# Patient Record
Sex: MALE | Race: BLACK OR AFRICAN AMERICAN | NOT HISPANIC OR LATINO | Employment: FULL TIME | URBAN - METROPOLITAN AREA
[De-identification: names, ages, dates, MRNs, and addresses within clinical notes are randomized per-mention and may not be internally consistent; named-entity substitution may affect disease eponyms.]

---

## 2021-09-27 ENCOUNTER — APPOINTMENT (OUTPATIENT)
Dept: GENERAL RADIOLOGY | Facility: HOSPITAL | Age: 21
End: 2021-09-27

## 2021-09-27 ENCOUNTER — HOSPITAL ENCOUNTER (EMERGENCY)
Facility: HOSPITAL | Age: 21
Discharge: HOME | End: 2021-09-28
Attending: EMERGENCY MEDICINE | Admitting: EMERGENCY MEDICINE

## 2021-09-27 DIAGNOSIS — U07.1 COVID-19: Primary | ICD-10-CM

## 2021-09-27 LAB
BASOPHILS # BLD AUTO: 0 10*3/MM3 (ref 0–0.2)
BASOPHILS NFR BLD AUTO: 0.4 % (ref 0–1.5)
DEPRECATED RDW RBC AUTO: 40.7 FL (ref 37–54)
EOSINOPHIL # BLD AUTO: 0 10*3/MM3 (ref 0–0.4)
EOSINOPHIL NFR BLD AUTO: 0.1 % (ref 0.3–6.2)
ERYTHROCYTE [DISTWIDTH] IN BLOOD BY AUTOMATED COUNT: 13.5 % (ref 12.3–15.4)
HCT VFR BLD AUTO: 41.2 % (ref 37.5–51)
HGB BLD-MCNC: 14.3 G/DL (ref 13–17.7)
LYMPHOCYTES # BLD AUTO: 0.5 10*3/MM3 (ref 0.7–3.1)
LYMPHOCYTES NFR BLD AUTO: 12 % (ref 19.6–45.3)
MCH RBC QN AUTO: 29.8 PG (ref 26.6–33)
MCHC RBC AUTO-ENTMCNC: 34.6 G/DL (ref 31.5–35.7)
MCV RBC AUTO: 86.2 FL (ref 79–97)
MONOCYTES # BLD AUTO: 0.3 10*3/MM3 (ref 0.1–0.9)
MONOCYTES NFR BLD AUTO: 7.8 % (ref 5–12)
NEUTROPHILS NFR BLD AUTO: 3.5 10*3/MM3 (ref 1.7–7)
NEUTROPHILS NFR BLD AUTO: 79.7 % (ref 42.7–76)
NRBC BLD AUTO-RTO: 0 /100 WBC (ref 0–0.2)
PLATELET # BLD AUTO: 181 10*3/MM3 (ref 140–450)
PMV BLD AUTO: 7.7 FL (ref 6–12)
RBC # BLD AUTO: 4.78 10*6/MM3 (ref 4.14–5.8)
SARS-COV-2 RNA PNL SPEC NAA+PROBE: DETECTED
WBC # BLD AUTO: 4.4 10*3/MM3 (ref 3.4–10.8)

## 2021-09-27 PROCEDURE — 71045 X-RAY EXAM CHEST 1 VIEW: CPT

## 2021-09-27 PROCEDURE — 80048 BASIC METABOLIC PNL TOTAL CA: CPT | Performed by: EMERGENCY MEDICINE

## 2021-09-27 PROCEDURE — 99283 EMERGENCY DEPT VISIT LOW MDM: CPT

## 2021-09-27 PROCEDURE — 85730 THROMBOPLASTIN TIME PARTIAL: CPT | Performed by: EMERGENCY MEDICINE

## 2021-09-27 PROCEDURE — 85379 FIBRIN DEGRADATION QUANT: CPT | Performed by: EMERGENCY MEDICINE

## 2021-09-27 PROCEDURE — 87635 SARS-COV-2 COVID-19 AMP PRB: CPT | Performed by: EMERGENCY MEDICINE

## 2021-09-27 PROCEDURE — C9803 HOPD COVID-19 SPEC COLLECT: HCPCS | Performed by: EMERGENCY MEDICINE

## 2021-09-27 PROCEDURE — 85025 COMPLETE CBC W/AUTO DIFF WBC: CPT | Performed by: EMERGENCY MEDICINE

## 2021-09-27 PROCEDURE — 93005 ELECTROCARDIOGRAM TRACING: CPT

## 2021-09-27 PROCEDURE — 84484 ASSAY OF TROPONIN QUANT: CPT | Performed by: EMERGENCY MEDICINE

## 2021-09-27 PROCEDURE — 93005 ELECTROCARDIOGRAM TRACING: CPT | Performed by: EMERGENCY MEDICINE

## 2021-09-27 PROCEDURE — 85610 PROTHROMBIN TIME: CPT | Performed by: EMERGENCY MEDICINE

## 2021-09-28 VITALS
TEMPERATURE: 99.8 F | HEART RATE: 90 BPM | DIASTOLIC BLOOD PRESSURE: 71 MMHG | SYSTOLIC BLOOD PRESSURE: 127 MMHG | OXYGEN SATURATION: 100 % | BODY MASS INDEX: 25.58 KG/M2 | WEIGHT: 210.1 LBS | HEIGHT: 76 IN | RESPIRATION RATE: 20 BRPM

## 2021-09-28 LAB
ANION GAP SERPL CALCULATED.3IONS-SCNC: 13 MMOL/L (ref 5–15)
APTT PPP: 27.7 SECONDS (ref 24–31)
BUN SERPL-MCNC: 11 MG/DL (ref 6–20)
BUN/CREAT SERPL: 10.9 (ref 7–25)
CALCIUM SPEC-SCNC: 8.8 MG/DL (ref 8.6–10.5)
CHLORIDE SERPL-SCNC: 103 MMOL/L (ref 98–107)
CO2 SERPL-SCNC: 23 MMOL/L (ref 22–29)
CREAT SERPL-MCNC: 1.01 MG/DL (ref 0.76–1.27)
D DIMER PPP FEU-MCNC: 0.23 MG/L (FEU) (ref 0–0.59)
GFR SERPL CREATININE-BSD FRML MDRD: 115 ML/MIN/1.73
GLUCOSE SERPL-MCNC: 87 MG/DL (ref 65–99)
INR PPP: 1.09 (ref 0.93–1.1)
POTASSIUM SERPL-SCNC: 3.5 MMOL/L (ref 3.5–5.2)
PROTHROMBIN TIME: 12 SECONDS (ref 9.6–11.7)
SODIUM SERPL-SCNC: 139 MMOL/L (ref 136–145)
TROPONIN T SERPL-MCNC: <0.01 NG/ML (ref 0–0.03)

## 2021-09-30 LAB — QT INTERVAL: 324 MS

## 2023-07-13 NOTE — H&P (VIEW-ONLY)
07/13/2023  Foot and Ankle Surgery - New Patient   Provider: Dr. Eliezer Coelho DPM  Location: [facility]    Subjective:  Andrew Toney is a 22 y.o. male.     Chief Complaint   Patient presents with    Right Ankle - Pain, Fracture    Establish Care     No PCP       HPI:  The patient is a 22-year-old male who presents to the office today for evaluation of right ankle injury. He is accompanied by an adult female today.     The patient sustained a right ankle injury on 07/07/2023 while spinning around on a stripper pole. The pole fell off the roof while he was on the pole, and he attempted to catch himself with his right foot. He reports constant pain in his right ankle and has significant pain when he wraps it. He notes significant bruising and swelling. The patient states that he removes the boot to go to bed occasionally.    The patient reports a history of growth plate repair.    No Known Allergies    History reviewed. No pertinent past medical history.    History reviewed. No pertinent surgical history.    Family History   Problem Relation Age of Onset    Dislocations Mother     Cancer Father        Social History     Socioeconomic History    Marital status: Single   Tobacco Use    Smoking status: Some Days     Types: Cigars   Substance and Sexual Activity    Alcohol use: Yes     Alcohol/week: 10.0 standard drinks     Types: 10 Shots of liquor per week     Comment: Drink on weekends only    Drug use: Never    Sexual activity: Yes     Partners: Male     Birth control/protection: Condom        Current Outpatient Medications on File Prior to Visit   Medication Sig Dispense Refill    traMADol (ULTRAM) 50 MG tablet Take 1 tablet by mouth Every 6 (Six) Hours As Needed for Moderate Pain. (Patient not taking: Reported on 7/13/2023) 10 tablet 0     No current facility-administered medications on file prior to visit.       Review of Systems:  General: Denies fever, chills, fatigue, and weakness.  Eyes: Denies  "vision loss, blurry vision, and excessive redness.  ENT: Denies hearing issues and difficulty swallowing.  Cardiovascular: Denies palpitations, chest pain, or syncopal episodes.  Respiratory: Denies shortness of breath, wheezing, and coughing.  GI: Denies abdominal pain, nausea, and vomiting.   : Denies frequency, hematuria, and urgency.  Musculoskeletal: Denies muscle cramps, joint pains, and stiffness.  Derm: Denies rash, open wounds, or suspicious lesions.  Neuro: Denies headaches, numbness, loss of coordination, and tremors.  Psych: Denies anxiety and depression.  Endocrine: Denies temperature intolerance and changes in appetite.  Heme: Denies bleeding disorders or abnormal bruising.     Objective   Pulse 85   Ht 193 cm (76\")   Wt 107 kg (235 lb)   SpO2 99%   BMI 28.61 kg/m²     Foot/Ankle Exam    GENERAL  Orientation:  AAOx3  Affect:  appropriate    VASCULAR     Right Foot Vascularity   Normal vascular exam    Dorsalis pedis:  2+  Posterior tibial:  2+  Skin temperature:  warm  Edema grading:  None  CFT:  < 3 seconds  Pedal hair growth:  Present  Varicosities:  none     Left Foot Vascularity   Normal vascular exam    Dorsalis pedis:  2+  Posterior tibial:  2+  Skin temperature:  warm  Edema grading:  None  CFT:  < 3 seconds  Pedal hair growth:  Present  Varicosities:  none     NEUROLOGIC     Right Foot Neurologic   Light touch sensation: normal  Hot/Cold sensation: normal  Achilles reflex:  2+     Left Foot Neurologic   Light touch sensation: normal  Hot/Cold sensation:  normal  Achilles reflex:  2+    MUSCULOSKELETAL     Right Foot Musculoskeletal   Arch:  Normal     Left Foot Musculoskeletal   Arch:  Normal    MUSCLE STRENGTH     Right Foot Muscle Strength   Normal strength    Foot dorsiflexion:  5  Foot plantar flexion:  5  Foot inversion:  5  Foot eversion:  5     Left Foot Muscle Strength   Normal strength    Foot dorsiflexion:  5  Foot plantar flexion:  5  Foot inversion:  5  Foot eversion:  " 5    DERMATOLOGIC      Right Foot Dermatologic   Skin  Right foot skin is intact.   Nails comment:  Nails 1-5     Left Foot Dermatologic   Skin  Left foot skin is intact.   Nails comment:  Nails 1-5    TESTS     Right Foot Tests   Anterior drawer: negative  Varus tilt: negative     Left Foot Tests   Anterior drawer: negative  Varus tilt: negative     Right foot additional comments: Significant swelling, ecchymosis, and pain involving the perimalleolar region. No proximal fibular pain. No resting deformity. Range of motion, muscle strength and instability testing deferred secondary to guarding. X-ray shows a displaced unstable ankle fracture.    Assessment & Plan   Diagnoses and all orders for this visit:    1. Acute right ankle pain (Primary)    2. Closed displaced trimalleolar fracture of right ankle, initial encounter  -     XR Chest 2 View; Future  -     ECG 12 Lead; Future  -     Case Request; Standing  -     CBC (No Diff); Future  -     Basic Metabolic Panel; Future  -     ceFAZolin (ANCEF) 2,000 mg in sodium chloride 0.9 % 100 mL IVPB  -     Case Request    Other orders  -     methylPREDNISolone (MEDROL) 4 MG dose pack; Take as directed on package instructions.  Dispense: 21 tablet; Refill: 0  -     Follow Anesthesia Guidelines / Protocol; Future  -     Follow Anesthesia Guidelines / Protocol; Standing  -     Verify / Perform Chlorhexidine Skin Prep; Standing  -     Verify / Perform Chlorhexidine Skin Prep if Indicated (If Not Already Completed); Standing  -     Obtain Informed Consent; Future  -     Provide NPO Instructions to Patient; Future  -     Chlorhexidine Skin Prep; Future        The patient is a 22-year-old male who presents to the office today for evaluation of right ankle injury. X-rays of the right ankle, taken today, were independently reviewed revealing a displaced, unstable ankle fracture. We discussed the etiology and biomechanics involved with his right ankle injury. I recommend surgical  intervention with plate and screw fixation. We discussed the procedure, risks, benefits, and recovery at length. I explained that he will be non-weightbearing for at least 2 weeks following surgery. After surgery, we will transition him into a boot. I explained that it will take approximately 8 weeks before he is back in a regular shoe. I will prescribe a Medrol Dosepak today. I advised the patient to elevate his right ankle above the level of his heart. He was given a Tubigrip compression to help with swelling.  Patient is to remain nonweightbearing during the interim.  I have dispensed a cam boot for added protection.  Patient should remain in the boot a majority of the day and less resting.  I will have the surgery schedulers contact him regarding scheduling.       Greater than 45 minutes was spent before, during, and after evaluation for patient care.     Orders Placed This Encounter   Procedures    XR Chest 2 View     Standing Status:   Future     Standing Expiration Date:   7/14/2024     Order Specific Question:   Reason for Exam:     Answer:   Preop    CBC (No Diff)     Standing Status:   Future     Standing Expiration Date:   7/14/2024    Basic Metabolic Panel     Standing Status:   Future     Standing Expiration Date:   7/14/2024    Obtain Informed Consent     Standing Status:   Future     Order Specific Question:   Informed Consent Given For     Answer:   Open reduction internal fixation of right trimalleolar ankle fracture    Provide NPO Instructions to Patient     Standing Status:   Future    Chlorhexidine Skin Prep     Chlorhexidine Skin Prep and Instructions For All Patients Having A Procedure Requiring an Outward Incision if Not Allergic. If Allergic, Give Antibacterial Skin Wipes and Instructions. Do Not Use For Facial Cases or on Any Mucus Membranes.     Standing Status:   Future    ECG 12 Lead     Standing Status:   Future     Standing Expiration Date:   7/14/2024     Order Specific Question:    Reason for Exam:     Answer:   Preop        Note is dictated utilizing voice recognition software. Unfortunately this leads to occasional typographical errors. I apologize in advance if the situation occurs. If questions occur please do not hesitate to call our office.    Transcribed from ambient dictation for ADONIS Coelho DPM by Sakshi Mulligan.  07/13/23   13:33 EDT    Patient or patient representative verbalized consent to the visit recording.  I have personally performed the services described in this document as transcribed by the above individual, and it is both accurate and complete.

## 2023-07-14 PROBLEM — S82.851A CLOSED DISPLACED TRIMALLEOLAR FRACTURE OF RIGHT LOWER LEG: Status: ACTIVE | Noted: 2023-07-14

## 2023-07-21 ENCOUNTER — HOSPITAL ENCOUNTER (OUTPATIENT)
Facility: HOSPITAL | Age: 23
Setting detail: HOSPITAL OUTPATIENT SURGERY
Discharge: HOME | End: 2023-07-21
Attending: PODIATRIST | Admitting: PODIATRIST
Payer: MEDICAID

## 2023-07-21 VITALS
HEIGHT: 75 IN | HEART RATE: 81 BPM | WEIGHT: 230 LBS | RESPIRATION RATE: 18 BRPM | OXYGEN SATURATION: 97 % | DIASTOLIC BLOOD PRESSURE: 66 MMHG | SYSTOLIC BLOOD PRESSURE: 122 MMHG | BODY MASS INDEX: 28.6 KG/M2 | TEMPERATURE: 97.7 F

## 2023-07-21 DIAGNOSIS — S82.851A CLOSED DISPLACED TRIMALLEOLAR FRACTURE OF RIGHT ANKLE, INITIAL ENCOUNTER: ICD-10-CM

## 2023-07-21 PROCEDURE — C1713 ANCHOR/SCREW BN/BN,TIS/BN: HCPCS | Performed by: PODIATRIST

## 2023-07-21 PROCEDURE — 27822 TREATMENT OF ANKLE FRACTURE: CPT | Performed by: PODIATRIST

## 2023-07-21 PROCEDURE — 25010000002 HYDROMORPHONE 1 MG/ML SOLUTION: Performed by: NURSE ANESTHETIST, CERTIFIED REGISTERED

## 2023-07-21 DEVICE — IMPLANTABLE DEVICE
Type: IMPLANTABLE DEVICE | Site: ANKLE | Status: FUNCTIONAL
Brand: ORTHOLOC

## 2023-07-21 DEVICE — IMPLANTABLE DEVICE
Type: IMPLANTABLE DEVICE | Site: ANKLE | Status: FUNCTIONAL
Brand: DARCO

## 2023-07-21 DEVICE — IMPLANTABLE DEVICE
Type: IMPLANTABLE DEVICE | Site: ANKLE | Status: FUNCTIONAL
Brand: ORTHOLOC 3DI

## 2023-07-21 DEVICE — IMPLANTABLE DEVICE
Type: IMPLANTABLE DEVICE | Site: ANKLE | Status: FUNCTIONAL
Brand: ORTHOLOC 3DI PLATING SYSTEM

## 2023-07-21 RX ORDER — IPRATROPIUM BROMIDE AND ALBUTEROL SULFATE 2.5; .5 MG/3ML; MG/3ML
3 SOLUTION RESPIRATORY (INHALATION) ONCE AS NEEDED
Status: DISCONTINUED | OUTPATIENT
Start: 2023-07-21 | End: 2023-07-21 | Stop reason: HOSPADM

## 2023-07-21 RX ORDER — ACETAMINOPHEN 325 MG/1
650 TABLET ORAL ONCE AS NEEDED
Status: DISCONTINUED | OUTPATIENT
Start: 2023-07-21 | End: 2023-07-21 | Stop reason: HOSPADM

## 2023-07-21 RX ORDER — PROMETHAZINE HYDROCHLORIDE 25 MG/1
25 TABLET ORAL ONCE AS NEEDED
Status: DISCONTINUED | OUTPATIENT
Start: 2023-07-21 | End: 2023-07-21 | Stop reason: HOSPADM

## 2023-07-21 RX ORDER — MEPERIDINE HYDROCHLORIDE 25 MG/ML
12.5 INJECTION INTRAMUSCULAR; INTRAVENOUS; SUBCUTANEOUS
Status: DISCONTINUED | OUTPATIENT
Start: 2023-07-21 | End: 2023-07-21 | Stop reason: HOSPADM

## 2023-07-21 RX ORDER — DIPHENHYDRAMINE HYDROCHLORIDE 50 MG/ML
12.5 INJECTION INTRAMUSCULAR; INTRAVENOUS ONCE AS NEEDED
Status: DISCONTINUED | OUTPATIENT
Start: 2023-07-21 | End: 2023-07-21 | Stop reason: HOSPADM

## 2023-07-21 RX ORDER — DIPHENHYDRAMINE HCL 25 MG
25 CAPSULE ORAL
Status: DISCONTINUED | OUTPATIENT
Start: 2023-07-21 | End: 2023-07-21 | Stop reason: HOSPADM

## 2023-07-21 RX ORDER — HYDROCODONE BITARTRATE AND ACETAMINOPHEN 10; 325 MG/1; MG/1
1 TABLET ORAL EVERY 4 HOURS PRN
Status: DISCONTINUED | OUTPATIENT
Start: 2023-07-21 | End: 2023-07-21 | Stop reason: HOSPADM

## 2023-07-21 RX ORDER — NALOXONE HCL 0.4 MG/ML
0.4 VIAL (ML) INJECTION AS NEEDED
Status: DISCONTINUED | OUTPATIENT
Start: 2023-07-21 | End: 2023-07-21 | Stop reason: HOSPADM

## 2023-07-21 RX ORDER — MIDAZOLAM HYDROCHLORIDE 1 MG/ML
2 INJECTION INTRAMUSCULAR; INTRAVENOUS
Status: DISCONTINUED | OUTPATIENT
Start: 2023-07-21 | End: 2023-07-21 | Stop reason: HOSPADM

## 2023-07-21 RX ORDER — LABETALOL HYDROCHLORIDE 5 MG/ML
5 INJECTION, SOLUTION INTRAVENOUS
Status: DISCONTINUED | OUTPATIENT
Start: 2023-07-21 | End: 2023-07-21 | Stop reason: HOSPADM

## 2023-07-21 RX ORDER — ACETAMINOPHEN 650 MG/1
325 SUPPOSITORY RECTAL EVERY 4 HOURS PRN
Status: DISCONTINUED | OUTPATIENT
Start: 2023-07-21 | End: 2023-07-21 | Stop reason: HOSPADM

## 2023-07-21 RX ORDER — EPHEDRINE SULFATE 5 MG/ML
5 INJECTION INTRAVENOUS ONCE AS NEEDED
Status: DISCONTINUED | OUTPATIENT
Start: 2023-07-21 | End: 2023-07-21 | Stop reason: HOSPADM

## 2023-07-21 RX ORDER — FLUMAZENIL 0.1 MG/ML
0.5 INJECTION INTRAVENOUS AS NEEDED
Status: DISCONTINUED | OUTPATIENT
Start: 2023-07-21 | End: 2023-07-21 | Stop reason: HOSPADM

## 2023-07-21 RX ORDER — FENTANYL CITRATE 50 UG/ML
50 INJECTION, SOLUTION INTRAMUSCULAR; INTRAVENOUS
Status: DISCONTINUED | OUTPATIENT
Start: 2023-07-21 | End: 2023-07-21 | Stop reason: HOSPADM

## 2023-07-21 RX ORDER — HYDROCODONE BITARTRATE AND ACETAMINOPHEN 7.5; 325 MG/1; MG/1
1 TABLET ORAL EVERY 6 HOURS PRN
Qty: 28 TABLET | Refills: 0 | Status: SHIPPED | OUTPATIENT
Start: 2023-07-21 | End: 2023-07-30

## 2023-07-21 RX ORDER — HYDRALAZINE HYDROCHLORIDE 20 MG/ML
5 INJECTION INTRAMUSCULAR; INTRAVENOUS
Status: DISCONTINUED | OUTPATIENT
Start: 2023-07-21 | End: 2023-07-21 | Stop reason: HOSPADM

## 2023-07-21 RX ORDER — DIPHENHYDRAMINE HYDROCHLORIDE 50 MG/ML
12.5 INJECTION INTRAMUSCULAR; INTRAVENOUS
Status: DISCONTINUED | OUTPATIENT
Start: 2023-07-21 | End: 2023-07-21 | Stop reason: HOSPADM

## 2023-07-21 RX ORDER — SODIUM CHLORIDE 0.9 % (FLUSH) 0.9 %
10 SYRINGE (ML) INJECTION AS NEEDED
Status: DISCONTINUED | OUTPATIENT
Start: 2023-07-21 | End: 2023-07-21 | Stop reason: HOSPADM

## 2023-07-21 RX ORDER — PROMETHAZINE HYDROCHLORIDE 25 MG/1
25 SUPPOSITORY RECTAL ONCE AS NEEDED
Status: DISCONTINUED | OUTPATIENT
Start: 2023-07-21 | End: 2023-07-21 | Stop reason: HOSPADM

## 2023-07-21 RX ORDER — SODIUM CHLORIDE 9 MG/ML
40 INJECTION, SOLUTION INTRAVENOUS AS NEEDED
Status: DISCONTINUED | OUTPATIENT
Start: 2023-07-21 | End: 2023-07-21 | Stop reason: HOSPADM

## 2023-07-21 RX ORDER — LORAZEPAM 2 MG/ML
1 INJECTION INTRAMUSCULAR
Status: DISCONTINUED | OUTPATIENT
Start: 2023-07-21 | End: 2023-07-21 | Stop reason: HOSPADM

## 2023-07-21 RX ORDER — SODIUM CHLORIDE 0.9 % (FLUSH) 0.9 %
10 SYRINGE (ML) INJECTION EVERY 12 HOURS SCHEDULED
Status: DISCONTINUED | OUTPATIENT
Start: 2023-07-21 | End: 2023-07-21 | Stop reason: HOSPADM

## 2023-07-21 RX ORDER — SODIUM CHLORIDE, SODIUM LACTATE, POTASSIUM CHLORIDE, AND CALCIUM CHLORIDE .6; .31; .03; .02 G/100ML; G/100ML; G/100ML; G/100ML
20 INJECTION, SOLUTION INTRAVENOUS CONTINUOUS
Status: DISCONTINUED | OUTPATIENT
Start: 2023-07-21 | End: 2023-07-21 | Stop reason: HOSPADM

## 2023-07-21 RX ORDER — ONDANSETRON 2 MG/ML
4 INJECTION INTRAMUSCULAR; INTRAVENOUS ONCE AS NEEDED
Status: DISCONTINUED | OUTPATIENT
Start: 2023-07-21 | End: 2023-07-21 | Stop reason: HOSPADM

## 2023-07-21 NOTE — OP NOTE
Operative Note   Foot and Ankle Surgery   Provider: Dr. Eliezer Coelho   Location: [facility]      Procedure:  1.  Open reduction internal fixation of trimalleolar ankle fracture without posterior lip fixation, right    Pre-operative Diagnosis:   1.  Closed, displaced trimalleolar ankle fracture, right    Post-operative Diagnosis: Same    Surgeon: Eliezer Coelho    Assistant: Antony Caceres, PGY 3    Anesthesia: General with block    Implants: Sudeep distal contoured fibular plate with locking and nonlocking 3.5 millimeter screws; cannulated 3.5 millimeter screw x2; cannulated 4.0 millimeter screw x2    Findings: No unexpected findings    Specimen: None    Blood Loss: Less than 5cc    Complications: None    Post Op Plan: Discharge home.  Strict nonweightbearing activity.  Follow-up with me in 2 weeks    Summary:    Patient is a 22-year-old male that has been seen in office after injury involving his right lower extremity.  Imaging was reviewed in office showing displaced trimalleolar ankle fracture.  I did review the imaging, diagnosis, and treatment options with him at length.  We reviewed the need for open reduction and internal fixation.  I discussed the procedure and aftercare with him at length.  He understands that he will require decreased overall activity and off weightbearing immediately after surgery.  We did discuss risks of posttraumatic arthritis and potential need for hardware removal or additional surgeries in the future.    Procedure, risks, complications, and goals were discussed with the patient at bedside.  Risks include but are not limited to infection, complications from anesthesia (including death), chronic pain or numbness, hematoma/seroma, deep vein thrombosis, wound complications, and potential for additional surgical procedures.  Patient understands and elects to proceed with surgery at this time. Informed consent was obtained before proceeding to the operating suite.  All questions  were answered to the patient's satisfaction. No guarantees or assurances were given or implied.    Procedure:    Patient was brought to the operating room and placed on the operative table in supine position.  Once adequate general anesthesia was administered, a pneumatic tourniquet was placed about the patient's right thigh.  The right lower extremity was scrubbed prepped and draped in the usual sterile fashion.  The limb was elevated and exsanguinated and the pneumatic tourniquet was inflated to 300 mmHg.  A formal timeout was conducted prior skin incision.    Attention was then directed to the lateral aspect of the ankle.  A linear longitudinal incision was performed over the midline of the distal fibula.  Incision was deepened in layers to the level of the fracture site.  Care was taken to preserve the superficial peroneal nerve.  The fracture hematoma was evacuated and irrigation was performed.  The fracture was mobilized and reduced and maintained.  Reduction was checked with fluoroscopy.  Definitive fixation was achieved utilizing two 3.5 mm cannulated screws from an anterior to posterior orientation across the fracture site.  The fixation was then neutralized with a distal contoured fibular plate secured to bone utilizing 3.5 millimeter locking and nonlocking screws of various lengths.  Fixation was placed under fluoroscopic guidance.    Attention was then directed to the medial malleolus.  A linear longitudinal incision was performed.  Again, incision was deepened to the level of the fracture site.  Disruption of the periosteum was identified and the fracture hematoma was evacuated.  All interposed periosteum was removed.  Reduction was achieved with a tenaculum and fixation was achieved utilizing two 4.0 mm cannulated screws from a distal to proximal orientation across the transverse fracture.  Final imaging was performed showing excellent reduction of the trimalleolar ankle fracture and stable internal  fixation.  The posterior malleolar fragment was identified which was well reduced and small and did not require fixation.  The syndesmosis was stressed under live fluoroscopy showing no diastases or medial gapping.  The incision sites were irrigated with normal saline.  Deep structures were closed with a 2-0 Vicryl in a simple interrupted manner.  The subcutaneous tissues were closed with 4-0 Vicryl and the skin was repaired with skin staples.  Incisions were dressed with Xeroform and sterile compressive dressings.  The tourniquet was released and a prompt hyperemic response was noted to all digits of the right lower extremity.  The limb was placed into a well-padded posterior splint.  Patient tolerated the procedure and anesthesia well.  He was transferred from the operating room to the recovery room with vital signs stable and neurovascular status unchanged to the right lower extremity.      Dr. Eliezer Coelho, ZIA  [facility]  [phone]    Note is dictated utilizing voice recognition software. Unfortunately this leads to occasional typographical errors. I apologize in advance if the situation occurs. If questions occur please do not hesitate to call our office.

## 2023-07-27 ENCOUNTER — HOSPITAL ENCOUNTER (EMERGENCY)
Facility: HOSPITAL | Age: 23
Discharge: HOME | End: 2023-07-27
Attending: EMERGENCY MEDICINE
Payer: MEDICAID

## 2023-07-27 VITALS
DIASTOLIC BLOOD PRESSURE: 65 MMHG | WEIGHT: 229.94 LBS | BODY MASS INDEX: 28.59 KG/M2 | HEART RATE: 78 BPM | SYSTOLIC BLOOD PRESSURE: 115 MMHG | TEMPERATURE: 98.3 F | OXYGEN SATURATION: 94 % | HEIGHT: 75 IN | RESPIRATION RATE: 19 BRPM

## 2023-07-27 DIAGNOSIS — G89.18 POSTOPERATIVE PAIN: Primary | ICD-10-CM

## 2023-07-27 PROCEDURE — 99283 EMERGENCY DEPT VISIT LOW MDM: CPT

## 2023-07-27 RX ORDER — HYDROCODONE BITARTRATE AND ACETAMINOPHEN 7.5; 325 MG/1; MG/1
1 TABLET ORAL ONCE
Status: COMPLETED | OUTPATIENT
Start: 2023-07-27 | End: 2023-07-27

## 2023-07-27 RX ADMIN — HYDROCODONE BITARTRATE AND ACETAMINOPHEN 1 TABLET: 7.5; 325 TABLET ORAL at 04:30

## 2023-07-27 NOTE — ED PROVIDER NOTES
Subjective   History of Present Illness  22-year-old male with postoperative right ankle pain status post trimalleolar fracture repair per Dr. Coelho on 7/21/2023.  Patient denies any calf pain or posterior knee pain or medial thigh pain.  No fever, no drainage.  Pain has been doing well until the evenings for the past several nights.  Patient has a follow-up appointment on 8/3/2023    Review of Systems   Musculoskeletal:         As per HPI     Past Medical History:   Diagnosis Date    Murmur, heart     states he has had this since he was young       No Known Allergies    Past Surgical History:   Procedure Laterality Date    ANKLE OPEN REDUCTION INTERNAL FIXATION Right 7/21/2023    Procedure: ANKLE OPEN REDUCTION INTERNAL FIXATION;  Surgeon: ADONIS Coelho DPM;  Location: [facility];  Service: Podiatry;  Laterality: Right;    ELBOW PROCEDURE      TONSILLECTOMY         Family History   Problem Relation Age of Onset    Dislocations Mother     Cancer Father        Social History     Socioeconomic History    Marital status: Single   Tobacco Use    Smoking status: Some Days     Types: Cigars   Vaping Use    Vaping Use: Never used   Substance and Sexual Activity    Alcohol use: Yes     Alcohol/week: 10.0 standard drinks     Types: 10 Shots of liquor per week     Comment: Drink on weekends only    Drug use: Never    Sexual activity: Yes     Partners: Male     Birth control/protection: Condom           Objective   Physical Exam  Constitutional:       Appearance: Normal appearance.   Musculoskeletal:      Comments: Toes move up and down, normal cap refill, toes are warm and well-perfused, no calf tenderness or pain or tenderness over deep venous system, no edema appreciated   Skin:     General: Skin is warm and dry.   Neurological:      Mental Status: He is alert.   Psychiatric:         Mood and Affect: Mood normal.         Behavior: Behavior normal.       Procedures           ED Course                                            Medical Decision Making  Well-appearing 22-year-old male with postoperative ankle pain.  Low suspicion for infection, I explained to them I prefer not to disturb the wound at this point, return precautions reviewed, signs and symptoms of DVT discussed, not present today, recommend close follow-up with his podiatrist and I recommended they call him in the morning.    Risk  Prescription drug management.        Final diagnoses:   None       ED Disposition  ED Disposition       None            No follow-up provider specified.       Medication List      No changes were made to your prescriptions during this visit.

## 2023-07-28 ENCOUNTER — TELEPHONE (OUTPATIENT)
Dept: PODIATRY | Facility: CLINIC | Age: 23
End: 2023-07-28
Payer: MEDICAID

## 2023-07-28 NOTE — TELEPHONE ENCOUNTER
Caller: Andrew Toney    Relationship: Self    Best call back number: [phone]    Requested Prescriptions: NORCO 7.5-325 MG  Requested Prescriptions      No prescriptions requested or ordered in this encounter        Pharmacy where request should be sent:  [facility] [place]     Last office visit with prescribing clinician: 7/13/2023     Next office visit with prescribing clinician: 8/3/2023     Does the patient have less than a 3 day supply:  [x] Yes  [] No    Would you like a call back once the refill request has been completed: [x] Yes [] No    If the office needs to give you a call back, can they leave a voicemail: [x] Yes [] No    Mariella Rosado Rep   07/28/23 12:20 EDT

## 2023-07-30 RX ORDER — HYDROCODONE BITARTRATE AND ACETAMINOPHEN 7.5; 325 MG/1; MG/1
1 TABLET ORAL EVERY 8 HOURS PRN
Qty: 20 TABLET | Refills: 0 | Status: SHIPPED | OUTPATIENT
Start: 2023-07-30

## 2023-08-01 ENCOUNTER — TELEPHONE (OUTPATIENT)
Dept: PODIATRY | Facility: CLINIC | Age: 23
End: 2023-08-01

## 2023-08-01 ENCOUNTER — OFFICE VISIT (OUTPATIENT)
Dept: PODIATRY | Facility: CLINIC | Age: 23
End: 2023-08-01
Payer: MEDICAID

## 2023-08-01 VITALS — HEIGHT: 75 IN | HEART RATE: 96 BPM | OXYGEN SATURATION: 98 % | WEIGHT: 229 LBS | BODY MASS INDEX: 28.47 KG/M2

## 2023-08-01 DIAGNOSIS — S82.851A CLOSED DISPLACED TRIMALLEOLAR FRACTURE OF RIGHT ANKLE, INITIAL ENCOUNTER: ICD-10-CM

## 2023-08-01 DIAGNOSIS — M25.571 ACUTE RIGHT ANKLE PAIN: Primary | ICD-10-CM

## 2023-08-15 ENCOUNTER — OFFICE VISIT (OUTPATIENT)
Dept: PODIATRY | Facility: CLINIC | Age: 23
End: 2023-08-15
Payer: MEDICAID

## 2023-08-15 VITALS — HEIGHT: 75 IN | HEART RATE: 76 BPM | BODY MASS INDEX: 28.47 KG/M2 | WEIGHT: 229 LBS | OXYGEN SATURATION: 98 %

## 2023-08-15 DIAGNOSIS — S82.851D CLOSED DISPLACED TRIMALLEOLAR FRACTURE OF RIGHT ANKLE WITH ROUTINE HEALING, SUBSEQUENT ENCOUNTER: Primary | ICD-10-CM

## 2023-08-15 PROCEDURE — 1160F RVW MEDS BY RX/DR IN RCRD: CPT | Performed by: PODIATRIST

## 2023-08-15 PROCEDURE — 1159F MED LIST DOCD IN RCRD: CPT | Performed by: PODIATRIST

## 2023-08-15 PROCEDURE — 99024 POSTOP FOLLOW-UP VISIT: CPT | Performed by: PODIATRIST

## 2023-08-15 NOTE — PROGRESS NOTES
"08/15/2023  Foot and Ankle Surgery - Established Patient/Follow-up  Provider: Dr. Eliezer Coelho DPM  Location: [facility]    Subjective:  Andrew Toney is a 22 y.o. male.     Chief Complaint   Patient presents with    Right Ankle - Follow-up, Fracture     Closed displaced trimalleolar fracture of right ankle    Follow-up     No PCP       HPI: Andrew Toney presents today for follow up of closed displaced trimalleolar fracture of right ankle.    The patient reports he is doing a lot better. He notes he has not been applying anything to his incision site. He affirms he has been performing his range of motions. The patient states since last visit, he has been able to bare some weight to it when standing. He reports he is not interested in physical therapy. The patient inquires about leaving the hardware in his foot. He mentions he can feel something move to the right, in his foot.     No Known Allergies    Current Outpatient Medications on File Prior to Visit   Medication Sig Dispense Refill    HYDROcodone-acetaminophen (Norco) 7.5-325 MG per tablet Take 1 tablet by mouth Every 8 (Eight) Hours As Needed for Moderate Pain. (Patient not taking: Reported on 8/15/2023) 20 tablet 0     No current facility-administered medications on file prior to visit.       Objective   Pulse 76   Ht 190.5 cm (75\")   Wt 104 kg (229 lb)   SpO2 98%   BMI 28.62 kg/mý     Foot/Ankle Exam    GENERAL  Orientation:  AAOx3  Affect:  appropriate    VASCULAR     Right Foot Vascularity   Normal vascular exam    Dorsalis pedis:  2+  Posterior tibial:  2+  Skin temperature:  warm  Edema grading:  None  CFT:  < 3 seconds  Pedal hair growth:  Present  Varicosities:  none     Left Foot Vascularity   Normal vascular exam    Dorsalis pedis:  2+  Posterior tibial:  2+  Skin temperature:  warm  Edema grading:  None  CFT:  < 3 seconds  Pedal hair growth:  Present  Varicosities:  none     NEUROLOGIC     Right Foot Neurologic   Light touch " sensation: normal  Hot/Cold sensation: normal  Achilles reflex:  2+     Left Foot Neurologic   Light touch sensation: normal  Hot/Cold sensation:  normal  Achilles reflex:  2+    MUSCULOSKELETAL     Right Foot Musculoskeletal   Arch:  Normal     Left Foot Musculoskeletal   Arch:  Normal    MUSCLE STRENGTH     Right Foot Muscle Strength   Normal strength    Foot dorsiflexion:  5  Foot plantar flexion:  5  Foot inversion:  5  Foot eversion:  5     Left Foot Muscle Strength   Normal strength    Foot dorsiflexion:  5  Foot plantar flexion:  5  Foot inversion:  5  Foot eversion:  5    DERMATOLOGIC      Right Foot Dermatologic   Skin  Right foot skin is intact.   Nails comment:  Nails 1-5     Left Foot Dermatologic   Skin  Left foot skin is intact.   Nails comment:  Nails 1-5    TESTS     Right Foot Tests   Anterior drawer: negative  Varus tilt: negative     Left Foot Tests   Anterior drawer: negative  Varus tilt: negative     Right foot additional comments: 07/13/2023: Significant swelling, ecchymosis, and pain involving the perimalleolar region. No proximal fibular pain. No resting deformity. Range of motion, muscle strength and instability testing deferred secondary to guarding. X-ray shows a displaced unstable ankle fracture.    08/01/2023:  Incision sites are dry and stable with intact suture, staples. No evidence of dehiscence or infection. Mild erythema involving the anterior aspect of the ankle consistent with dressing burn. No breakdown. Range of motion is limited as expected secondary to guarding.    08/15/2023: Improved range of motion, mild swelling noted to the perimalar region. No significant discomfort with palpation. Incision sites are well healed.    Assessment & Plan   Diagnoses and all orders for this visit:    1. Closed displaced trimalleolar fracture of right ankle with routine healing, subsequent encounter (Primary)  -     XR Ankle 3+ View Right        Patient is a 22-year-old male who presents today  to follow up on his closed displaced trimalleolar fracture of right ankle.  Imaging was reviewed showing stable internal fixation and interval signs of healing.  Advised the patient his swelling has improved and the incision site looks healthy. Recommended he apply lotion on the incisions to help the dry dead skin. Advised him to continue range of motion exercises. Informed him he may experience swelling in his ankle, which is normal. Recommended he wear his boot for two more weeks. Advised him to slowly transition from crutches to shoes. Informed him the soft tissue healing is important. Informed him physical therapy can be beneficial for him but he can also perform exercises at home. He will follow up in four weeks for reevaluation and repeat imaging.    Orders Placed This Encounter   Procedures    XR Ankle 3+ View Right     Order Specific Question:   Reason for Exam:     Answer:   Closed displaced trimalleolar fracture of right ankle rm 15 non-wb     Order Specific Question:   Does this patient have a diabetic monitoring/medication delivering device on?     Answer:   No     Order Specific Question:   Release to patient     Answer:   Routine Release [[id number]]          Note is dictated utilizing voice recognition software. Unfortunately this leads to occasional typographical errors. I apologize in advance if the situation occurs. If questions occur please do not hesitate to call our office.    Transcribed from ambient dictation for ADONIS Coelho DPM by Barby Evans.  08/15/23   14:57 EDT    Patient or patient representative verbalized consent to the visit recording.  I have personally performed the services described in this document as transcribed by the above individual, and it is both accurate and complete.

## 2023-08-31 ENCOUNTER — TELEPHONE (OUTPATIENT)
Dept: PODIATRY | Facility: CLINIC | Age: 23
End: 2023-08-31

## 2023-08-31 NOTE — TELEPHONE ENCOUNTER
PATIENT WILL NEED TO MAKE APPT FOR THIS.  HAS NOT SEEN HIM FOR THIS CONCERN SO HE WOULD NEED TO EVAL HIM TO MAKE A PLAN OF CARE

## 2023-08-31 NOTE — TELEPHONE ENCOUNTER
Caller: IRAM     Relationship to patient: SELF     Best call back number: [phone]    Patient is needing: PT CALLED IN STATING HE IS HAVING PAIN IN HIS TOES ON HIS RIGHT FOOT , HE STATES THE PAIN IS EVERYDAY AND HAS BEEN HAPPENING FOR A COUPLE OF WEEKS. HE IS WANTING TO KNOW WHAT HE SHOULD DO , OR WHAT DR RECOMMENDS. PLEASE ADVISE

## 2023-09-12 ENCOUNTER — OFFICE VISIT (OUTPATIENT)
Dept: PODIATRY | Facility: CLINIC | Age: 23
End: 2023-09-12
Payer: MEDICAID

## 2023-09-12 VITALS
OXYGEN SATURATION: 98 % | RESPIRATION RATE: 20 BRPM | HEART RATE: 73 BPM | HEIGHT: 75 IN | BODY MASS INDEX: 28.47 KG/M2 | WEIGHT: 229 LBS

## 2023-09-12 DIAGNOSIS — S82.851D CLOSED DISPLACED TRIMALLEOLAR FRACTURE OF RIGHT ANKLE WITH ROUTINE HEALING, SUBSEQUENT ENCOUNTER: Primary | ICD-10-CM

## 2023-09-12 PROCEDURE — 1160F RVW MEDS BY RX/DR IN RCRD: CPT | Performed by: PODIATRIST

## 2023-09-12 PROCEDURE — 99024 POSTOP FOLLOW-UP VISIT: CPT | Performed by: PODIATRIST

## 2023-09-12 PROCEDURE — 1159F MED LIST DOCD IN RCRD: CPT | Performed by: PODIATRIST

## 2023-09-12 NOTE — PROGRESS NOTES
"09/12/2023  Foot and Ankle Surgery - Established Patient/Follow-up  Provider: Dr. Eliezer Coelho DPM  Location: [facility]    Subjective:  Andrew Toney is a 22 y.o. male.     Chief Complaint   Patient presents with    Right Ankle - Fracture, Follow-up     Closed displaced trimalleolar fx    Follow-up     No pcp       HPI: The patient is a 22-year-old male who presents to the clinic for a follow-up for issues involving the right ankle.    The patient states that he has been unable to sleep since his surgery approximately 7 weeks ago. He reports that he has been out of the boot and in a regular shoe for approximately 2 weeks. He states that he started wearing sandals and tried to wear a shoe approximately 1 week ago. He notes that it was a low top shoe and it was not bad, but the shoe was tight because of the swelling. He states that he pushed all of the swelling up above his leg.    No Known Allergies    Current Outpatient Medications on File Prior to Visit   Medication Sig Dispense Refill    HYDROcodone-acetaminophen (Norco) 7.5-325 MG per tablet Take 1 tablet by mouth Every 8 (Eight) Hours As Needed for Moderate Pain. (Patient not taking: Reported on 8/15/2023) 20 tablet 0     No current facility-administered medications on file prior to visit.       Objective   Pulse 73   Resp 20   Ht 190.5 cm (75\")   Wt 104 kg (229 lb)   SpO2 98%   BMI 28.62 kg/m²     Foot/Ankle Exam  GENERAL  Orientation:  AAOx3  Affect:  appropriate    VASCULAR     Right Foot Vascularity   Normal vascular exam    Dorsalis pedis:  2+  Posterior tibial:  2+  Skin temperature:  warm  Edema grading:  None  CFT:  < 3 seconds  Pedal hair growth:  Present  Varicosities:  none     Left Foot Vascularity   Normal vascular exam    Dorsalis pedis:  2+  Posterior tibial:  2+  Skin temperature:  warm  Edema grading:  None  CFT:  < 3 seconds  Pedal hair growth:  Present  Varicosities:  none     NEUROLOGIC     Right Foot Neurologic   Light " touch sensation: normal  Hot/Cold sensation: normal  Achilles reflex:  2+     Left Foot Neurologic   Light touch sensation: normal  Hot/Cold sensation:  normal  Achilles reflex:  2+    MUSCULOSKELETAL     Right Foot Musculoskeletal   Arch:  Normal     Left Foot Musculoskeletal   Arch:  Normal    MUSCLE STRENGTH     Right Foot Muscle Strength   Normal strength    Foot dorsiflexion:  5  Foot plantar flexion:  5  Foot inversion:  5  Foot eversion:  5     Left Foot Muscle Strength   Normal strength    Foot dorsiflexion:  5  Foot plantar flexion:  5  Foot inversion:  5  Foot eversion:  5    DERMATOLOGIC      Right Foot Dermatologic   Skin  Right foot skin is intact.   Nails comment:  Nails 1-5     Left Foot Dermatologic   Skin  Left foot skin is intact.   Nails comment:  Nails 1-5    TESTS     Right Foot Tests   Anterior drawer: negative  Varus tilt: negative     Left Foot Tests   Anterior drawer: negative  Varus tilt: negative     Right foot additional comments: 07/13/2023: Significant swelling, ecchymosis, and pain involving the perimalleolar region. No proximal fibular pain. No resting deformity. Range of motion, muscle strength and instability testing deferred secondary to guarding. X-ray shows a displaced unstable ankle fracture.    08/01/2023:  Incision sites are dry and stable with intact suture, staples. No evidence of dehiscence or infection. Mild erythema involving the anterior aspect of the ankle consistent with dressing burn. No breakdown. Range of motion is limited as expected secondary to guarding.    08/15/2023: Improved range of motion, mild swelling noted to the perimalar region. No significant discomfort with palpation. Incision sites are well healed.    09/12/2023:Mild swelling. Continued decreased range of motion and discomfort with range of motion exercises. No progressive deformity or instability.    Assessment & Plan   Diagnoses and all orders for this visit:    1. Closed displaced trimalleolar  fracture of right ankle with routine healing, subsequent encounter (Primary)  -     XR Ankle 3+ View Right  -     Ambulatory Referral to Physical Therapy Evaluate and treat (ROM, Strengthening/Stretching, Manual Therapy, Estim)      The patient is a 22-year-old male who presents to the office today for a follow-up regarding his right ankle. X-rays of the right ankle, taken today, were independently reviewed revealing stable hardware and fracture which is healing nicely. We discussed the etiology and biomechanics involved with his right ankle pain. I explained that the swelling is going to be part of the process for him to return to normal activity. I recommend physical therapy. I recommend wearing an ankle sleeve daily. I recommend tennis shoes. I explained that he will be dependent on how he feels. I recommend ibuprofen. The patient will return to the office in 6 weeks for reevaluation.    Orders Placed This Encounter   Procedures    XR Ankle 3+ View Right     Order Specific Question:   Reason for Exam:     Answer:   Closed displaced trimalleolar fx rm 13 wb     Order Specific Question:   Does this patient have a diabetic monitoring/medication delivering device on?     Answer:   No     Order Specific Question:   Release to patient     Answer:   Routine Release [[id number]]    Ambulatory Referral to Physical Therapy Evaluate and treat (ROM, Strengthening/Stretching, Manual Therapy, Estim)     Referral Priority:   Routine     Referral Type:   Physical Therapy     Referral Reason:   Specialty Services Required     Requested Specialty:   Physical Therapy     Number of Visits Requested:   1          Note is dictated utilizing voice recognition software. Unfortunately this leads to occasional typographical errors. I apologize in advance if the situation occurs. If questions occur please do not hesitate to call our office.    Transcribed from ambient dictation for ADOINS Coelho DPM by Vivi Rogers.  09/12/23   16:52  EDT    Patient or patient representative verbalized consent to the visit recording.  I have personally performed the services described in this document as transcribed by the above individual, and it is both accurate and complete.

## 2023-09-14 ENCOUNTER — TREATMENT (OUTPATIENT)
Dept: PHYSICAL THERAPY | Facility: CLINIC | Age: 23
End: 2023-09-14
Payer: MEDICAID

## 2023-09-14 DIAGNOSIS — S82.851D CLOSED DISPLACED TRIMALLEOLAR FRACTURE OF RIGHT ANKLE WITH ROUTINE HEALING, SUBSEQUENT ENCOUNTER: Primary | ICD-10-CM

## 2023-09-14 DIAGNOSIS — M25.571 ACUTE RIGHT ANKLE PAIN: ICD-10-CM

## 2023-09-14 NOTE — PROGRESS NOTES
Physical Therapy Initial Evaluation and Plan of Care  Office: [address], [place] [address]  P: [phone]  F: [phone]    Patient: Andrew Toney   : 2000  Diagnosis/ICD-10 Code:  Closed displaced trimalleolar fracture of right ankle with routine healing, subsequent encounter [S40.957S]  Referring practitioner: ADONIS Coelho DPM  Date of Initial Visit: 2023  Today's Date: 2023  Patient seen for 1 sessions           Subjective Questionnaire: FAAM: 60% functional ability; FAAM sports subscale: 0% functional ablity      Subjective Evaluation    History of Present Illness  Mechanism of injury: Pt reports that he hurt R ankle after he fell and landed on ankle 2023. Had surgery on 2023. Around one month later surgeon told him that he could take boot off in two weeks and start walking on it which was about  or so. Has mostly been walking in sandals but has tried to wear shoes. But felt worse with the shoe and it seemed like there was more swelling. Is waiting until swelling goes down to wear shoes. Surgeon wants pt to have more movement in the ankle so that's why surgeon referred him to PT. Still having some pain from the swelling but it has been better since getting neoprene sleeve. Does have some numbness and tingling in the foot but usually just with more activity. Ankle doesn't hurt as much, it's more the foot. Pt is trying to get back to work at this time. Waiting for interview. No previous injuries to ankle. Foot is very sensitive and it's difficult to ice it sometimes.     Pain  Current pain ratin  At best pain ratin  At worst pain ratin  Quality: burning and throbbing  Relieving factors: rest  Aggravating factors: ambulation, movement, squatting, standing and stairs    Social Support  Patient lives at: Does have stairs in home but doesn't really use them; deck has 3 steps.    Diagnostic Tests  Abnormal x-ray: see imaging.    Patient  Goals  Patient goals for therapy: return to work, independence with ADLs/IADLs, increased strength, decreased edema, decreased pain, improved balance, increased motion and return to sport/leisure activities  Patient goal: basketball, weightlifting, running       Past Medical History:   Diagnosis Date    Abnormal ECG     Murmur, heart     states he has had this since he was young        Past Surgical History:   Procedure Laterality Date    ANKLE OPEN REDUCTION INTERNAL FIXATION Right 07/21/2023    Procedure: ANKLE OPEN REDUCTION INTERNAL FIXATION;  Surgeon: ADONIS Coelho DPM;  Location: [facility];  Service: Podiatry;  Laterality: Right;    ANKLE OPEN REDUCTION INTERNAL FIXATION  7/21/2023    ELBOW PROCEDURE      TONSILLECTOMY          Objective          Observations     Right Ankle/Foot   Positive for effusion.     Additional Ankle/Foot Observation Details  Scar medial and lateral lower leg/ankle - min scab on the lateral scar still    Tenderness     Right Ankle/Foot   No tenderness.     Neurological Testing     Sensation     Ankle/Foot   Left Ankle/Foot   Intact: light touch    Right Ankle/Foot   Intact: light touch   Hypersensation: light touch    Comments   Right light touch: Some hypersensation noted in the toes.     Active Range of Motion   Left Ankle/Foot   Dorsiflexion (ke): 7 degrees   Plantar flexion: 58 degrees   Inversion: 40 degrees   Eversion: 25 degrees     Right Ankle/Foot   Dorsiflexion (ke): 0 degrees   Plantar flexion: 40 degrees   Inversion: 30 degrees with pain  Eversion: 8 degrees with pain    Strength/Myotome Testing     Left Ankle/Foot   Normal strength    Right Ankle/Foot   Dorsiflexion: 4-  Plantar flexion: 4-  Inversion: 4-  Eversion: 4-  Great toe flexion: 3  Great toe extension: 4-    Additional Strength Details  R ankle strength determined through observation    Ambulation   Weight-Bearing Status   Assistive device used: none    Observational Gait   Gait: antalgic   Decreased  walking speed and right stance time.     Quality of Movement During Gait     Additional Quality of Movement During Gait Details  Decreased heel strike on the R         Assessment & Plan       Assessment  Impairments: abnormal coordination, abnormal gait, abnormal muscle firing, abnormal muscle tone, abnormal or restricted ROM, activity intolerance, impaired balance, impaired physical strength, lacks appropriate home exercise program, pain with function and weight-bearing intolerance   Functional limitations: lifting, walking, uncomfortable because of pain, standing and unable to perform repetitive tasks   Assessment details: Pt is a 22 year old s/p R trimalleolar fx with ORIF on 7/21/2023. Pt demonstrates mod antalgic gait pattern with decreased ROM of the ankle. Poor activation/strength of the foot intrinsics as well. Increased swelling in the foot and ankle that is restricting ROM and overall function. Functional limitations are listed above. Pt will benefit from PT in order to address impairments and improve overall function.     Prognosis: good    Goals  Plan Goals: STG (3 weeks):  Pt will demonstrate increased activation of foot intrinsics during activities/exercises to decrease load on ankle/foot.   Pt will display improved ROM of R ankle/foot to WFL with no pain to assist with functional tasks.     LTG (6 weeks):  Pt will be independent with home exercises to assist with improved strength and continue to improve function.    Pt will demonstrate increased score on the FAAM to 85% and to FAAM sports subscale to 50% to demonstrate decreased overall impairment.   Pt will be able to ambulate with min gait deviations as well as min to no pain for >30 mins.  Pt will demonstrate improved hip and foot/ankle strength to 4+/5 overall to assist with function.      Plan  Therapy options: will be seen for skilled therapy services  Planned modality interventions: cryotherapy, electrical stimulation/Russian stimulation, TENS  and thermotherapy (hydrocollator packs)  Planned therapy interventions: abdominal trunk stabilization, ADL retraining, balance/weight-bearing training, body mechanics training, fine motor coordination training, flexibility, functional ROM exercises, gait training, home exercise program, joint mobilization, manual therapy, motor coordination training, neuromuscular re-education, soft tissue mobilization, spinal/joint mobilization, strengthening, stretching and therapeutic activities  Frequency: 2x week  Duration in weeks: 12  Treatment plan discussed with: patient      HEP:   Access Code: [email or web address]  URL: [email or web address]  Date: 09/14/2023  Prepared by: Cammie Miner    Exercises  - Seated Great Toe Extension  - 2 x daily - 10 reps - 5 sec hold  - Seated Lesser Toes Extension  - 2 x daily - 10 reps - 5 sec hold  - Seated Ankle Alphabet  - 2 x daily - 10 reps  - Towel Scrunches  - 2 x daily - 10 reps - 5 sec hold  - Seated Gastroc Stretch with Strap  - 2 x daily - 3 reps - 30 sec hold    History # of Personal Factors and/or Comorbidities: LOW (0)  Examination of Body System(s): # of elements: LOW (1-2)  Clinical Presentation: STABLE   Clinical Decision Making: LOW       Eval:  Low Eval     20     Mins  94476  Mod Eval          Mins  13202  High Eval                            Mins  94487    Timed:         Manual Therapy:         mins  63747;     Therapeutic Exercise:    15     mins  07381;     Neuromuscular Monet:        mins  10700;    Therapeutic Activity:          mins  19124;     Gait Training:           mins  86467;       Un-Timed:  Electrical Stimulation:    15     mins  32786 (MC );  Traction          mins 68637      Timed Treatment:   15   mins   Total Treatment:     50   mins    PT SIGNATURE: Cammie Miner, PT, DPT, OCS           [place] License # [id number]              DATE TREATMENT INITIATED: 9/14/2023    Initial Certification  Certification Period: 12/12/2023  I certify that the therapy  services are furnished while this patient is under my care.  The services outlined above are required by this patient, and will be reviewed every 90 days.     PHYSICIAN: ADONIS Coelho DPM      DATE:     Please sign and return via fax to [phone].. Thank you, [facility].

## 2023-09-18 ENCOUNTER — TREATMENT (OUTPATIENT)
Dept: PHYSICAL THERAPY | Facility: CLINIC | Age: 23
End: 2023-09-18
Payer: MEDICAID

## 2023-09-18 DIAGNOSIS — M25.571 ACUTE RIGHT ANKLE PAIN: ICD-10-CM

## 2023-09-18 DIAGNOSIS — S82.851D CLOSED DISPLACED TRIMALLEOLAR FRACTURE OF RIGHT ANKLE WITH ROUTINE HEALING, SUBSEQUENT ENCOUNTER: Primary | ICD-10-CM

## 2023-09-18 NOTE — PROGRESS NOTES
Physical Therapy Daily Note  Office: [address], [place] [address]  P: [phone]  F: [phone]    Patient: Andrew Toney   : 2000  Diagnosis/ICD-10 Code:  Closed displaced trimalleolar fracture of right ankle with routine healing, subsequent encounter [S82.851D]  Referring practitioner: No ref. provider found  Today's Date: 2023  Patient seen for 2 sessions                                                                                                                                                                                                                                                                                                                               VISIT#:  sessions authorized per POC (Recert due 2023 )     Precautions/Restrictions: S/p R trimalleolar fx on 2023 with ORIF on 2023    Subjective  Andrew Toney reports that the swelling in his toes has been better and not hurting as much. Exercises are going well.       Objective  Mod tightness in gastroc/soleus     See Exercise, Manual, and Modality Logs for complete treatment.     Home Exercises:  [id number]     Assessment/Plan   Pt demonstrates good progress with AROM of the R ankle. Improved activation of foot intrinsics as well with less shaking noted during toe curls. Did note some discomfort with SLS, therefore decreased stance time. Modalities at end of session for pain control.       Progress per Plan of Care and Progress strengthening /stabilization /functional activity            Timed:         Manual Therapy:    8     mins  74013;     Therapeutic Exercise:    15     mins  63743;     Neuromuscular Monet:    15    mins  72510;    Therapeutic Activity:          mins  14748;     Gait Training:           mins  87216;       Un-Timed:  Electrical Stimulation:    15     mins  04622 ( );    Timed Treatment:   38   mins   Total Treatment:     53   mins    Cammie Miner, PT, DPT, OCS      [place] License # [id number]

## 2023-09-21 ENCOUNTER — TREATMENT (OUTPATIENT)
Dept: PHYSICAL THERAPY | Facility: CLINIC | Age: 23
End: 2023-09-21

## 2023-09-21 DIAGNOSIS — M25.571 ACUTE RIGHT ANKLE PAIN: ICD-10-CM

## 2023-09-21 DIAGNOSIS — S82.851D CLOSED DISPLACED TRIMALLEOLAR FRACTURE OF RIGHT ANKLE WITH ROUTINE HEALING, SUBSEQUENT ENCOUNTER: Primary | ICD-10-CM

## 2023-09-21 NOTE — PROGRESS NOTES
Physical Therapy Daily Note  Office: [address], [place] [address]  P: [phone]  F: [phone]    Patient: Andrew Toney   : 2000  Diagnosis/ICD-10 Code:  Closed displaced trimalleolar fracture of right ankle with routine healing, subsequent encounter [S82.851D]  Referring practitioner: ADONIS Coelho DPM  Today's Date: 2023  Patient seen for 3 sessions                                                                                                                                                                                                                                                                                                                               VISIT#: 3/24 sessions authorized per POC (Recert due 2023 )     Precautions/Restrictions: S/p R trimalleolar fx on 2023 with ORIF on 2023    Subjective  Andrew Toney reports that his ankle is feeling better. His pain has improved a lot too. He can sleep better now.       Objective  Mod tightness in gastroc/soleus     See Exercise, Manual, and Modality Logs for complete treatment.     Home Exercises:  [id number]     Assessment/Plan   Pt demonstrates some swelling in the foot and ankle still, however has improved since start of treatment. Much improvement in activation of foot intrinsics with seated activities and some in standing as well. Pt able to perform single leg balance with less discomfort and less difficulty. Pt encouraged to continue to practice HEP and ice as needed for swelling.       Progress per Plan of Care and Progress strengthening /stabilization /functional activity            Timed:         Manual Therapy:         mins  00942;     Therapeutic Exercise:    23     mins  31196;     Neuromuscular Monet:    8    mins  95302;    Therapeutic Activity:          mins  85026;     Gait Training:           mins  22418;       Un-Timed:  Electrical Stimulation:    15     mins  26264 ( );    Timed  Treatment:   31   mins   Total Treatment:     46   mins    Cammie Miner, PT, DPT, OCS     [place] License # [id number]

## 2023-09-25 ENCOUNTER — TREATMENT (OUTPATIENT)
Dept: PHYSICAL THERAPY | Facility: CLINIC | Age: 23
End: 2023-09-25
Payer: MEDICAID

## 2023-09-25 DIAGNOSIS — S82.851D CLOSED DISPLACED TRIMALLEOLAR FRACTURE OF RIGHT ANKLE WITH ROUTINE HEALING, SUBSEQUENT ENCOUNTER: Primary | ICD-10-CM

## 2023-09-25 DIAGNOSIS — M25.571 ACUTE RIGHT ANKLE PAIN: ICD-10-CM

## 2023-09-25 NOTE — PROGRESS NOTES
Physical Therapy Daily Note  Office: [address], [place] [address]  P: [phone]  F: [phone]    Patient: Andrew Toney   : 2000  Diagnosis/ICD-10 Code:  Closed displaced trimalleolar fracture of right ankle with routine healing, subsequent encounter [S82.851D]  Referring practitioner: ADONIS Coelho DPM  Today's Date: 2023  Patient seen for 4 sessions                                                                                                                                                                                                                                                                                                                               VISIT#:  sessions authorized per POC (Recert due 2023 )     Precautions/Restrictions: S/p R trimalleolar fx on 2023 with ORIF on 2023    Subjective  Andrew Toney reports that he overdid it on the weekend and was standing too much so ankle has been more sore the last few days. Also more swollen. Felt fine after last PT session.       Objective  Mod swelling in foot and ankle     See Exercise, Manual, and Modality Logs for complete treatment.     Home Exercises:  [id number]     Assessment/Plan   Pt demonstrates improved mobility and decreased pain after manual treatment. Did note some discomfort with standing exercises.Pt instructed to try to avoid standing for long periods of time at this time. Modalities at end of session that helped with pain and swelling as well.     Progress per Plan of Care and Progress strengthening /stabilization /functional activity            Timed:         Manual Therapy:    8     mins  24152;     Therapeutic Exercise:    8     mins  18207;     Neuromuscular Monet:    8    mins  68101;    Therapeutic Activity:          mins  97558;     Gait Training:           mins  89013;       Un-Timed:  Electrical Stimulation:    15     mins  60615 ( );    Timed Treatment:   24   mins    Total Treatment:     39   mins (pt in clinic for ~55 mins total)     Cammie Miner, PT, DPT, OCS     [place] License # [id number]

## 2023-09-28 ENCOUNTER — TREATMENT (OUTPATIENT)
Dept: PHYSICAL THERAPY | Facility: CLINIC | Age: 23
End: 2023-09-28
Payer: MEDICAID

## 2023-09-28 DIAGNOSIS — M25.571 ACUTE RIGHT ANKLE PAIN: ICD-10-CM

## 2023-09-28 DIAGNOSIS — S82.851D CLOSED DISPLACED TRIMALLEOLAR FRACTURE OF RIGHT ANKLE WITH ROUTINE HEALING, SUBSEQUENT ENCOUNTER: Primary | ICD-10-CM

## 2023-09-28 NOTE — PROGRESS NOTES
Physical Therapy Daily Note  Office: [address], [place] [address]  P: [phone]  F: [phone]    Patient: Andrew Toney   : 2000  Diagnosis/ICD-10 Code:  Closed displaced trimalleolar fracture of right ankle with routine healing, subsequent encounter [S82.851D]  Referring practitioner: ADONIS Coelho DPM  Today's Date: 2023  Patient seen for 5 sessions                                                                                                                                                                                                                                                                                                                               VISIT#:  sessions authorized per POC (Recert due 2023 )     Precautions/Restrictions: S/p R trimalleolar fx on 2023 with ORIF on 2023    Subjective  Andrew Toney reports that his ankle is feeling better. Much better than last visit. His grandma massaged his ankle some yesterday which felt good. Exercises are going well at home.     Objective  Min swelling in foot and ankle     See Exercise, Manual, and Modality Logs for complete treatment.     Home Exercises:  [id number]     Assessment/Plan   Improved ability to perform standing exercises. Less discomfort noted this date. Had good fatigue with eccentric tap downs on the R LE. Improved balance in single leg stance on the R as well. Pt instructed to continue working on strengthening with theraband at home and given blue band for 4-way ankle.     Progress per Plan of Care and Progress strengthening /stabilization /functional activity            Timed:         Manual Therapy:    8     mins  21742;     Therapeutic Exercise:    8     mins  91990;     Neuromuscular Monet:    23    mins  32189;    Therapeutic Activity:          mins  86026;     Gait Training:           mins  30024;       Un-Timed:  Electrical Stimulation:    15     mins  40261 (  );    Timed Treatment:   39   mins   Total Treatment:     54   mins     Cammie Miner, PT, DPT, OCS     [place] License # [id number]

## 2023-10-02 ENCOUNTER — TREATMENT (OUTPATIENT)
Dept: PHYSICAL THERAPY | Facility: CLINIC | Age: 23
End: 2023-10-02
Payer: MEDICAID

## 2023-10-02 DIAGNOSIS — M25.571 ACUTE RIGHT ANKLE PAIN: ICD-10-CM

## 2023-10-02 DIAGNOSIS — S82.851D CLOSED DISPLACED TRIMALLEOLAR FRACTURE OF RIGHT ANKLE WITH ROUTINE HEALING, SUBSEQUENT ENCOUNTER: Primary | ICD-10-CM

## 2023-10-02 NOTE — PROGRESS NOTES
Physical Therapy Daily Note  Office: [address] [facility] [address], [place] [address]  P: [phone]  F: [phone]    Patient: Andrew Toney   : 2000  Diagnosis/ICD-10 Code:  Closed displaced trimalleolar fracture of right ankle with routine healing, subsequent encounter [S82.851D]  Referring practitioner: ADONIS Coelho DPM  Today's Date: 10/2/2023  Patient seen for 6 sessions                                                                                                                                                                                                                                                                                                                               VISIT#:  sessions authorized per POC (Recert due 2023 )     Precautions/Restrictions: S/p R trimalleolar fx on 2023 with ORIF on 2023    Subjective  Andrew Toney reports that his ankle/foot is feeling better.     Objective  Min swelling in foot and ankle     See Exercise, Manual, and Modality Logs for complete treatment.     Home Exercises:  [id number]     Assessment/Plan   Pt able to perform standing exercises well with little discomfort and pain. Pt is progressing well overall with improved mobility and less swelling. Continues to have some pain with weight-bearing for longer periods of time, however is also improving.     Progress per Plan of Care and Progress strengthening /stabilization /functional activity            Timed:         Manual Therapy:         mins  68604;     Therapeutic Exercise:    8     mins  07598;     Neuromuscular Monet:    23    mins  91704;    Therapeutic Activity:          mins  21861;     Gait Training:           mins  28958;       Un-Timed:  Electrical Stimulation:    15     mins  24009 ( );    Timed Treatment:   31   mins   Total Treatment:     46   mins     Cammie Miner, PT, DPT, OCS     [place] License # [id number]

## 2023-10-04 ENCOUNTER — TREATMENT (OUTPATIENT)
Dept: PHYSICAL THERAPY | Facility: CLINIC | Age: 23
End: 2023-10-04
Payer: MEDICAID

## 2023-10-04 DIAGNOSIS — S82.851D CLOSED DISPLACED TRIMALLEOLAR FRACTURE OF RIGHT ANKLE WITH ROUTINE HEALING, SUBSEQUENT ENCOUNTER: Primary | ICD-10-CM

## 2023-10-04 DIAGNOSIS — M25.571 ACUTE RIGHT ANKLE PAIN: ICD-10-CM

## 2023-10-04 NOTE — PROGRESS NOTES
Physical Therapy Treatment Note  [facility] [place]  [address]  [place] [address]    Patient: Andrew Toney   : 2000  Diagnosis/ICD-10 Code:  Closed displaced trimalleolar fracture of right ankle with routine healing, subsequent encounter [S82.851D]  Referring practitioner: ADONIS Coelho DPM  Date of Initial Visit: Type: THERAPY  Noted: 2023  Today's Date: 10/4/2023  Patient seen for 7 sessions           Subjective     Andrew Toney reports: He notes quite a bit of improvement since his last visit.    Objective   See Exercise, Manual, and Modality Logs for complete treatment.     See Exercise, Manual, and Modality Logs for complete treatment.      Home Exercises:  [id number]     Assessment/Plan    Pt able to perform standing exercises well with little discomfort and pain. Pt is progressing well overall with improved mobility and no notable swelling. Decreased pain with weight-bearing for longer periods of time..      Progress per Plan of Care and Progress strengthening /stabilization /functional activity       Timed:                                                                          Manual Therapy:                 mins  24269;                      Therapeutic Exercise:    8     mins  89521;     Neuromuscular Monet:    23    mins  95295;    Therapeutic Activity:           mins  64131;     Gait Training:                      mins  13761;        Un-Timed:  Electrical Stimulation:    15     mins  11346 ( );     Timed Treatment:   31   mins   Total Treatment:     46   mins     Aiden Reina PT  Physical Therapist

## 2023-10-09 ENCOUNTER — TELEPHONE (OUTPATIENT)
Dept: PHYSICAL THERAPY | Facility: CLINIC | Age: 23
End: 2023-10-09
Payer: MEDICAID

## 2023-10-09 NOTE — TELEPHONE ENCOUNTER
Caller: Andrew Toney    Relationship: Self         What was the call regarding:FEAVER AND COUGH

## 2023-10-12 ENCOUNTER — TREATMENT (OUTPATIENT)
Dept: PHYSICAL THERAPY | Facility: CLINIC | Age: 23
End: 2023-10-12
Payer: MEDICAID

## 2023-10-12 DIAGNOSIS — M25.571 ACUTE RIGHT ANKLE PAIN: ICD-10-CM

## 2023-10-12 DIAGNOSIS — S82.851D CLOSED DISPLACED TRIMALLEOLAR FRACTURE OF RIGHT ANKLE WITH ROUTINE HEALING, SUBSEQUENT ENCOUNTER: Primary | ICD-10-CM

## 2023-10-12 NOTE — PROGRESS NOTES
Physical Therapy Daily Note  Office: [address] [facility] [address], [place] [address]  P: [phone]  F: [phone]    Patient: Andrew Toney   : 2000  Diagnosis/ICD-10 Code:  Closed displaced trimalleolar fracture of right ankle with routine healing, subsequent encounter [S82.851D]  Referring practitioner: ADONIS Coelho DPM  Today's Date: 10/12/2023  Patient seen for 8 sessions                                                                                                                                                                                                                                                                                                                               VISIT#:  sessions authorized per POC (Recert due 2023 )     Precautions/Restrictions: S/p R trimalleolar fx on 2023 with ORIF on 2023    Subjective  Andrew Toney reports that he is doing better. Is having some pain today with more swelling than he had been having but his ROM is the same.     Objective  Min swelling in foot and ankle     See Exercise, Manual, and Modality Logs for complete treatment.     Home Exercises:  [id number]     Assessment/Plan   Pt demonstrates improved mobility of the foot with some restriction still noted with talocural jt and DF. Pt displays good progress with balance. Continues to have some pain with tap downs but is improving. Improved mobility after manual treatment. Followed with modalities for pain and swelling.     Progress per Plan of Care and Progress strengthening /stabilization /functional activity            Timed:         Manual Therapy:   8      mins  25205;     Therapeutic Exercise:    8     mins  47793;     Neuromuscular Monet:    23    mins  22377;    Therapeutic Activity:          mins  34647;     Gait Training:           mins  52208;       Un-Timed:  Electrical Stimulation:    15     mins  82525 ( );    Timed Treatment:   39   mins   Total Treatment:      54   mins     Cammie Miner, PT, DPT, OCS     [place] License # [id number]

## 2023-10-13 ENCOUNTER — HOSPITAL ENCOUNTER (EMERGENCY)
Facility: HOSPITAL | Age: 23
Discharge: HOME | End: 2023-10-13
Attending: EMERGENCY MEDICINE
Payer: MEDICAID

## 2023-10-13 ENCOUNTER — APPOINTMENT (OUTPATIENT)
Dept: CT IMAGING | Facility: HOSPITAL | Age: 23
End: 2023-10-13
Payer: MEDICAID

## 2023-10-13 VITALS
HEART RATE: 83 BPM | HEIGHT: 75 IN | BODY MASS INDEX: 25.99 KG/M2 | WEIGHT: 209 LBS | SYSTOLIC BLOOD PRESSURE: 142 MMHG | TEMPERATURE: 99.1 F | RESPIRATION RATE: 13 BRPM | DIASTOLIC BLOOD PRESSURE: 70 MMHG | OXYGEN SATURATION: 98 %

## 2023-10-13 DIAGNOSIS — U07.1 COVID-19 VIRUS DETECTED: Primary | ICD-10-CM

## 2023-10-13 LAB
ALBUMIN SERPL-MCNC: 4.5 G/DL (ref 3.5–5.2)
ALBUMIN/GLOB SERPL: 2 G/DL
ALP SERPL-CCNC: 90 U/L (ref 39–117)
ALT SERPL W P-5'-P-CCNC: 11 U/L (ref 1–41)
AMPHET+METHAMPHET UR QL: NEGATIVE
AMPHETAMINES UR QL: NEGATIVE
ANION GAP SERPL CALCULATED.3IONS-SCNC: 9.1 MMOL/L (ref 5–15)
AST SERPL-CCNC: 11 U/L (ref 1–40)
BARBITURATES UR QL SCN: NEGATIVE
BASOPHILS # BLD AUTO: 0.02 10*3/MM3 (ref 0–0.2)
BASOPHILS NFR BLD AUTO: 0.4 % (ref 0–1.5)
BENZODIAZ UR QL SCN: NEGATIVE
BILIRUB SERPL-MCNC: 0.2 MG/DL (ref 0–1.2)
BILIRUB UR QL STRIP: ABNORMAL
BUN SERPL-MCNC: 14 MG/DL (ref 6–20)
BUN/CREAT SERPL: 13.5 (ref 7–25)
BUPRENORPHINE SERPL-MCNC: NEGATIVE NG/ML
CALCIUM SPEC-SCNC: 9.3 MG/DL (ref 8.6–10.5)
CANNABINOIDS SERPL QL: POSITIVE
CHLORIDE SERPL-SCNC: 102 MMOL/L (ref 98–107)
CLARITY UR: CLEAR
CO2 SERPL-SCNC: 26.9 MMOL/L (ref 22–29)
COCAINE UR QL: NEGATIVE
COLOR UR: YELLOW
CREAT SERPL-MCNC: 1.04 MG/DL (ref 0.76–1.27)
DEPRECATED RDW RBC AUTO: 41.4 FL (ref 37–54)
EGFRCR SERPLBLD CKD-EPI 2021: 104.8 ML/MIN/1.73
EOSINOPHIL # BLD AUTO: 0.06 10*3/MM3 (ref 0–0.4)
EOSINOPHIL NFR BLD AUTO: 1.1 % (ref 0.3–6.2)
ERYTHROCYTE [DISTWIDTH] IN BLOOD BY AUTOMATED COUNT: 13 % (ref 12.3–15.4)
FLUAV SUBTYP SPEC NAA+PROBE: NOT DETECTED
FLUBV RNA ISLT QL NAA+PROBE: NOT DETECTED
GLOBULIN UR ELPH-MCNC: 2.3 GM/DL
GLUCOSE SERPL-MCNC: 120 MG/DL (ref 65–99)
GLUCOSE UR STRIP-MCNC: NEGATIVE MG/DL
HCT VFR BLD AUTO: 44.5 % (ref 37.5–51)
HGB BLD-MCNC: 14.8 G/DL (ref 13–17.7)
HGB UR QL STRIP.AUTO: NEGATIVE
IMM GRANULOCYTES # BLD AUTO: 0 10*3/MM3 (ref 0–0.05)
IMM GRANULOCYTES NFR BLD AUTO: 0 % (ref 0–0.5)
KETONES UR QL STRIP: ABNORMAL
LEUKOCYTE ESTERASE UR QL STRIP.AUTO: NEGATIVE
LIPASE SERPL-CCNC: 21 U/L (ref 13–60)
LYMPHOCYTES # BLD AUTO: 1.53 10*3/MM3 (ref 0.7–3.1)
LYMPHOCYTES NFR BLD AUTO: 27.4 % (ref 19.6–45.3)
MAGNESIUM SERPL-MCNC: 2.1 MG/DL (ref 1.6–2.6)
MCH RBC QN AUTO: 28.5 PG (ref 26.6–33)
MCHC RBC AUTO-ENTMCNC: 33.3 G/DL (ref 31.5–35.7)
MCV RBC AUTO: 85.7 FL (ref 79–97)
METHADONE UR QL SCN: NEGATIVE
MONOCYTES # BLD AUTO: 0.39 10*3/MM3 (ref 0.1–0.9)
MONOCYTES NFR BLD AUTO: 7 % (ref 5–12)
NEUTROPHILS NFR BLD AUTO: 3.58 10*3/MM3 (ref 1.7–7)
NEUTROPHILS NFR BLD AUTO: 64.1 % (ref 42.7–76)
NITRITE UR QL STRIP: NEGATIVE
OPIATES UR QL: NEGATIVE
OXYCODONE UR QL SCN: NEGATIVE
PCP UR QL SCN: NEGATIVE
PH UR STRIP.AUTO: 6.5 [PH] (ref 5–8)
PLATELET # BLD AUTO: 269 10*3/MM3 (ref 140–450)
PMV BLD AUTO: 9.6 FL (ref 6–12)
POTASSIUM SERPL-SCNC: 3.6 MMOL/L (ref 3.5–5.2)
PROPOXYPH UR QL: NEGATIVE
PROT SERPL-MCNC: 6.8 G/DL (ref 6–8.5)
PROT UR QL STRIP: NEGATIVE
RBC # BLD AUTO: 5.19 10*6/MM3 (ref 4.14–5.8)
SARS-COV-2 RNA RESP QL NAA+PROBE: DETECTED
SODIUM SERPL-SCNC: 138 MMOL/L (ref 136–145)
SP GR UR STRIP: 1.02 (ref 1–1.03)
TRICYCLICS UR QL SCN: NEGATIVE
UROBILINOGEN UR QL STRIP: ABNORMAL
WBC NRBC COR # BLD: 5.58 10*3/MM3 (ref 3.4–10.8)

## 2023-10-13 PROCEDURE — 80053 COMPREHEN METABOLIC PANEL: CPT | Performed by: NURSE PRACTITIONER

## 2023-10-13 PROCEDURE — 99285 EMERGENCY DEPT VISIT HI MDM: CPT

## 2023-10-13 PROCEDURE — 85025 COMPLETE CBC W/AUTO DIFF WBC: CPT | Performed by: NURSE PRACTITIONER

## 2023-10-13 PROCEDURE — 93005 ELECTROCARDIOGRAM TRACING: CPT

## 2023-10-13 PROCEDURE — 80306 DRUG TEST PRSMV INSTRMNT: CPT | Performed by: NURSE PRACTITIONER

## 2023-10-13 PROCEDURE — 25510000001 IOPAMIDOL PER 1 ML: Performed by: EMERGENCY MEDICINE

## 2023-10-13 PROCEDURE — 96360 HYDRATION IV INFUSION INIT: CPT

## 2023-10-13 PROCEDURE — 81003 URINALYSIS AUTO W/O SCOPE: CPT | Performed by: NURSE PRACTITIONER

## 2023-10-13 PROCEDURE — 87636 SARSCOV2 & INF A&B AMP PRB: CPT | Performed by: EMERGENCY MEDICINE

## 2023-10-13 PROCEDURE — 74177 CT ABD & PELVIS W/CONTRAST: CPT

## 2023-10-13 PROCEDURE — 25810000003 SODIUM CHLORIDE 0.9 % SOLUTION: Performed by: NURSE PRACTITIONER

## 2023-10-13 PROCEDURE — 83690 ASSAY OF LIPASE: CPT | Performed by: NURSE PRACTITIONER

## 2023-10-13 PROCEDURE — 83735 ASSAY OF MAGNESIUM: CPT | Performed by: NURSE PRACTITIONER

## 2023-10-13 RX ORDER — SODIUM CHLORIDE 0.9 % (FLUSH) 0.9 %
10 SYRINGE (ML) INJECTION AS NEEDED
Status: DISCONTINUED | OUTPATIENT
Start: 2023-10-13 | End: 2023-10-13 | Stop reason: HOSPADM

## 2023-10-13 RX ADMIN — IOPAMIDOL 100 ML: 755 INJECTION, SOLUTION INTRAVENOUS at 18:24

## 2023-10-13 RX ADMIN — SODIUM CHLORIDE 1000 ML: 9 INJECTION, SOLUTION INTRAVENOUS at 18:21

## 2023-10-13 NOTE — DISCHARGE INSTRUCTIONS
"Virus precautions, simple things to do at home to help with illness    You have been diagnosed with COVID-19 viral illness, supportive care recommended.    Wash/sanitize common household surfaces with antibacterial wipes.  Especially door knobs, light switches.    Change bed linens and wash bath towels/washcloths    Frequent handwashing    Cough/sneeze into your sleeve    Treat fever every 6-8 hours with age appropriate Tylenol (generic acetaminophen) or Ibuprofen according to package directions.      Over-the-counter medications for symptomatic relief may include: Mucinex (maximum 3 days), Sudafed, DayQuil/NyQuil, flonase nasal spray.  If you have history of high blood pressure please use caution with these medications.  Check with pharmacist for recommendations.  Coricidin has brand \"HBP\" which is better for patient's with high blood pressure.     Over-the-counter supplements including vitamin C, zinc  may also be helpful.      Return Precautions    Although you are being discharged from the ED today, I encourage you to return for worsening symptoms.  Things can, and do, change such that treatment at home with medication may not be adequate.      Specifically, return for any of the following:    Chest pain, shortness of breath, pain or nausea and vomiting not controlled by medications provided.    Please make a follow up with your Primary Care Provider for a blood pressure recheck.     "

## 2023-10-13 NOTE — FSED PROVIDER NOTE
EMERGENCY DEPARTMENT ENCOUNTER    Room Number:  06/06  Date seen:  10/13/2023  Time seen: 17:44 EDT  PCP: Provider, No Known  Historian: patient    Discussed/obtained information from independent historians: n/a    HPI:  Chief complaint:heart racing, congestion, RUQ pain  A complete HPI/ROS/PMH/PSH/SH/FH are unobtainable due to: n/a  Context:Andrew Toney is a 22 y.o. male who presents to the ED with c/o sensation of heart beating too fast, mild nasal congestion with mucous production and mild RUQ pain.  Does report heavy alcohol use on weekends. States he has not drank since Sunday.  Is former smoker, stopped 2 weeks ago.  DENIES chest pain, tightness, pressure, diarrhea.  States the only other time he felt his heart racing was when he had Covid.     External (non-ED) record review:  no recent notes/records    Chronic or social conditions impacting care:  n/a    ALLERGIES  Patient has no known allergies.    PAST MEDICAL HISTORY  Active Ambulatory Problems     Diagnosis Date Noted    Closed displaced trimalleolar fracture of right lower leg 07/14/2023     Resolved Ambulatory Problems     Diagnosis Date Noted    No Resolved Ambulatory Problems     Past Medical History:   Diagnosis Date    Abnormal ECG     Murmur, heart        PAST SURGICAL HISTORY  Past Surgical History:   Procedure Laterality Date    ANKLE OPEN REDUCTION INTERNAL FIXATION Right 07/21/2023    Procedure: ANKLE OPEN REDUCTION INTERNAL FIXATION;  Surgeon: ADONIS Coelho DPM;  Location: [facility] MAIN OR;  Service: Podiatry;  Laterality: Right;    ANKLE OPEN REDUCTION INTERNAL FIXATION  7/21/2023    ELBOW PROCEDURE      TONSILLECTOMY         FAMILY HISTORY  Family History   Problem Relation Age of Onset    Dislocations Mother     Cancer Father        SOCIAL HISTORY  Social History     Socioeconomic History    Marital status: Single   Tobacco Use    Smoking status: Some Days     Types: Cigars   Vaping Use    Vaping Use: Never used   Substance and  Sexual Activity    Alcohol use: Not Currently     Comment: Drink on weekends only    Drug use: Never    Sexual activity: Not Currently     Partners: Male     Birth control/protection: Condom       REVIEW OF SYSTEMS  Review of Systems    All systems reviewed and negative except for those discussed in HPI.     PHYSICAL EXAM    I have reviewed the triage vital signs and nursing notes.  Vitals:    10/13/23 1803   BP: 138/78   Pulse: 85   Resp: 13   Temp:    SpO2: 99%     Physical Exam    GENERAL: not distressed  HENT: nares patent, no tonsillar edema or erythema  EYES: no scleral icterus  NECK: no ROM limitations  CV: regular rhythm, regular rate, no murmur  RESPIRATORY: normal effort, CTAB  ABDOMEN: soft, non-tender  : deferred  MUSCULOSKELETAL: no deformity  NEURO: alert, moves all extremities, follows commands  SKIN: warm, dry    LAB RESULTS  Recent Results (from the past 24 hour(s))   ECG 12 Lead Tachycardia    Collection Time: 10/13/23  5:04 PM   Result Value Ref Range    QT Interval 368 ms    QTC Interval 422 ms   Comprehensive Metabolic Panel    Collection Time: 10/13/23  5:53 PM    Specimen: Blood   Result Value Ref Range    Glucose 120 (H) 65 - 99 mg/dL    BUN 14 6 - 20 mg/dL    Creatinine 1.04 0.76 - 1.27 mg/dL    Sodium 138 136 - 145 mmol/L    Potassium 3.6 3.5 - 5.2 mmol/L    Chloride 102 98 - 107 mmol/L    CO2 26.9 22.0 - 29.0 mmol/L    Calcium 9.3 8.6 - 10.5 mg/dL    Total Protein 6.8 6.0 - 8.5 g/dL    Albumin 4.5 3.5 - 5.2 g/dL    ALT (SGPT) 11 1 - 41 U/L    AST (SGOT) 11 1 - 40 U/L    Alkaline Phosphatase 90 39 - 117 U/L    Total Bilirubin 0.2 0.0 - 1.2 mg/dL    Globulin 2.3 gm/dL    A/G Ratio 2.0 g/dL    BUN/Creatinine Ratio 13.5 7.0 - 25.0    Anion Gap 9.1 5.0 - 15.0 mmol/L    eGFR 104.8 >60.0 mL/min/1.73   Lipase    Collection Time: 10/13/23  5:53 PM    Specimen: Blood   Result Value Ref Range    Lipase 21 13 - 60 U/L   Magnesium    Collection Time: 10/13/23  5:53 PM    Specimen: Blood   Result  Value Ref Range    Magnesium 2.1 1.6 - 2.6 mg/dL   CBC Auto Differential    Collection Time: 10/13/23  5:53 PM    Specimen: Blood   Result Value Ref Range    WBC 5.58 3.40 - 10.80 10*3/mm3    RBC 5.19 4.14 - 5.80 10*6/mm3    Hemoglobin 14.8 13.0 - 17.7 g/dL    Hematocrit 44.5 37.5 - 51.0 %    MCV 85.7 79.0 - 97.0 fL    MCH 28.5 26.6 - 33.0 pg    MCHC 33.3 31.5 - 35.7 g/dL    RDW 13.0 12.3 - 15.4 %    RDW-SD 41.4 37.0 - 54.0 fl    MPV 9.6 6.0 - 12.0 fL    Platelets 269 140 - 450 10*3/mm3    Neutrophil % 64.1 42.7 - 76.0 %    Lymphocyte % 27.4 19.6 - 45.3 %    Monocyte % 7.0 5.0 - 12.0 %    Eosinophil % 1.1 0.3 - 6.2 %    Basophil % 0.4 0.0 - 1.5 %    Immature Grans % 0.0 0.0 - 0.5 %    Neutrophils, Absolute 3.58 1.70 - 7.00 10*3/mm3    Lymphocytes, Absolute 1.53 0.70 - 3.10 10*3/mm3    Monocytes, Absolute 0.39 0.10 - 0.90 10*3/mm3    Eosinophils, Absolute 0.06 0.00 - 0.40 10*3/mm3    Basophils, Absolute 0.02 0.00 - 0.20 10*3/mm3    Immature Grans, Absolute 0.00 0.00 - 0.05 10*3/mm3   COVID-19 and FLU A/B PCR - Swab, Nasopharynx    Collection Time: 10/13/23  5:53 PM    Specimen: Nasopharynx; Swab   Result Value Ref Range    COVID19 Detected (C) Not Detected - Ref. Range    Influenza A PCR Not Detected Not Detected    Influenza B PCR Not Detected Not Detected   Urinalysis without microscopic (no culture) - Urine, Clean Catch    Collection Time: 10/13/23  6:03 PM    Specimen: Urine, Clean Catch   Result Value Ref Range    Color, UA Yellow Yellow, Straw    Appearance, UA Clear Clear    pH, UA 6.5 5.0 - 8.0    Specific Gravity, UA 1.025 1.005 - 1.030    Glucose, UA Negative Negative    Ketones, UA 40 mg/dL (2+) (A) Negative    Bilirubin, UA Small (1+) (A) Negative    Blood, UA Negative Negative    Protein, UA Negative Negative    Leuk Esterase, UA Negative Negative    Nitrite, UA Negative Negative    Urobilinogen, UA 0.2 E.U./dL 0.2 - 1.0 E.U./dL   Urine Drug Screen - Urine, Clean Catch    Collection Time: 10/13/23  6:03  PM    Specimen: Urine, Clean Catch   Result Value Ref Range    THC, Screen, Urine Positive (A) Negative    Phencyclidine (PCP), Urine Negative Negative    Cocaine Screen, Urine Negative Negative    Methamphetamine, Ur Negative Negative    Opiate Screen Negative Negative    Amphetamine Screen, Urine Negative Negative    Benzodiazepine Screen, Urine Negative Negative    Tricyclic Antidepressants Screen Negative Negative    Methadone Screen, Urine Negative Negative    Barbiturates Screen, Urine Negative Negative    Oxycodone Screen, Urine Negative Negative    Propoxyphene Screen Negative Negative    Buprenorphine, Screen, Urine Negative Negative       Ordered the above labs and independently interpreted results.  My findings will be discussed in the ED course or medical decision making section below    RADIOLOGY RESULTS  CT Abdomen Pelvis With Contrast    Result Date: 10/13/2023  CT ABDOMEN PELVIS W CONTRAST Date of Exam: 10/13/2023 6:12 PM EDT Indication: epigastric and RUQ pain. Comparison: None available. Technique: Axial CT images were obtained of the abdomen and pelvis following the uneventful intravenous administration of iodinated contrast. Sagittal and coronal reconstructions were performed.  Automated exposure control and iterative reconstruction methods were used. Findings: Visualized Chest:  The visualized lung bases and lower mediastinal structures are unremarkable. Liver: Liver is normal in size and CT density. No focal lesions. Gallbladder: The gallbladder is normal without evidence of radiopaque stones. Bile Ducts: No billiary dcutal dilation. Spleen: Spleen is normal in size and CT density. Pancreas: Pancreas is normal. There is no evidence of pancreatic mass or peripancreatic fluid. Adrenals: Adrenal glands are unremarkable. Kidneys: Kidneys are normal in size. There are no stones or hydronephrosis. Gastrointestinal: Mild stool burden throughout the colon. Otherwise unremarkable Bladder:  Circumferential bladder wall thickening, nonspecific Pelvis: Mildly heterogeneous seminal vesicles. Otherwise unremarkable. Peritoneum/Mesentery: No fluid collection, ascities, or free air.   Lymph Nodes: No lymphadenopathy. Vasculature: Unremarkable Abdominal Wall: Unremarkable Bony Structures: Multiple small disc bulges are noted in the lower lumbar spine, most significantly at L4-L5 and L5-S1 causing at least moderate stenosis of the spinal canal. No acute osseous abnormality.     Impression: Nonspecific mildly heterogeneous seminal vesicles and circumferential thickening of the urinary bladder. Please correlate with urinalysis for cystitis. Otherwise no acute intra-abdominal or intrapelvic abnormality. Electronically Signed: Abdirizak Woodruff DO  10/13/2023 6:38 PM EDT  Workstation ID: [id number]      Ordered the above noted radiological studies.  Independently interpreted by me.  My findings will be discussed in the medical decision section below.     PROGRESS, DATA ANALYSIS, CONSULTS AND MEDICAL DECISION MAKING    Please note that this section constitutes my independent interpretation of clinical data including lab results, radiology, EKG's.  This constitutes my independent professional opinion regarding differential diagnosis and management of this patient.  It may include any factors such as history from outside sources, review of external records, social determinants of health, management of medications, response to those treatments, and discussions with other providers.    ED Course as of 10/13/23 1905   Fri Oct 13, 2023   1745 EKG shows normal sinus rhythm with a rate of 79 bpm.  There is diffuse ST segment elevation that is most likely early repolarization.  No other acute ST segment or T wave abnormalities noted.  No ectopy.  Normal intervals. [KZ]   1829 COVID19(!!): Detected [EW]      ED Course User Index  [EW] Ximena Roman APRN  [KZ] Edgardo Wallis MD     Orders placed during this  visit:  Orders Placed This Encounter   Procedures    COVID-19 and FLU A/B PCR - Swab, Nasopharynx    CT Abdomen Pelvis With Contrast    Comprehensive Metabolic Panel    Lipase    Magnesium    CBC Auto Differential    Urinalysis without microscopic (no culture) - Urine, Clean Catch    Urine Drug Screen - Urine, Clean Catch    Pulse Oximetry Continuous    ECG 12 Lead Tachycardia    Blood Draw With IV Start    Insert peripheral IV    CBC & Differential    ED Acknowledgement Form Needed;            Medical Decision Making  Amount and/or Complexity of Data Reviewed  Labs: ordered. Decision-making details documented in ED Course.  Radiology: ordered.    Risk  Prescription drug management.      MDM/dispo:  Pt is positive for COVID.  I have updated him about this and discussed home isolation precautions.  He is not tachycardic.  Labs reassuring.  Abdominal exam without peritoneal signs. No evidence of acute abdomen at this time. Well appearing. Given work up, low suspicion for acute hepatobiliary disease (including acute cholecystitis or cholangitis, CT normal), acute pancreatitis (neg lipase, CT normal), PUD (including gastric perforation), acute infectious processes (pneumonia, hepatitis, pyelonephritis), acute appendicitis, vascular catastrophe, bowel obstruction, viscus perforation, or testicular torsion, diverticulitis. Presentation not consistent with other acute, emergent causes of abdominal pain at this time.  He is not hypoxic.  RTER  precautions advised. I did discuss the mentioned lumbar disc bulges noted on imaging with patient; he denies any back pain symptoms.           DIAGNOSIS  Final diagnoses:   COVID-19 virus detected          Medication List      No changes were made to your prescriptions during this visit.         FOLLOW-UP  PATIENT CONNECTION - [place] [address] [phone]    or make appointment with your primary care provider        Latest Documented Vital Signs:  As of 19:05 EDT  BP-  138/78 HR- 85 Temp- 99.1 øF (37.3 øC) (Oral) O2 sat- 99%    Appropriate PPE utilized throughout this patient encounter to include mask, if indicated, per current protocol. Hand hygiene was performed before donning PPE and after removal when leaving the room.    Please note that portions of this were completed with a voice recognition program.     Note Disclaimer: At [facility], we believe that sharing information builds trust and better relationships. You are receiving this note because you are receiving care at [facility] or recently visited. It is possible you will see health information before a provider has talked with you about it. This kind of information can be easy to misunderstand. To help you fully understand what it means for your health, we urge you to discuss this note with your provider.

## 2023-10-13 NOTE — ED TRIAGE NOTES
Patient to ed from  with complaints of rapid heart rate, chills, vomiting and upper abd pain. Patient states he has been drinking sun-wed

## 2023-10-14 LAB
QT INTERVAL: 368 MS
QTC INTERVAL: 422 MS

## 2023-10-20 ENCOUNTER — TREATMENT (OUTPATIENT)
Dept: PHYSICAL THERAPY | Facility: CLINIC | Age: 23
End: 2023-10-20
Payer: MEDICAID

## 2023-10-20 DIAGNOSIS — M25.571 ACUTE RIGHT ANKLE PAIN: ICD-10-CM

## 2023-10-20 DIAGNOSIS — S82.851D CLOSED DISPLACED TRIMALLEOLAR FRACTURE OF RIGHT ANKLE WITH ROUTINE HEALING, SUBSEQUENT ENCOUNTER: Primary | ICD-10-CM

## 2023-10-20 NOTE — PROGRESS NOTES
Physical Therapy Daily Note  Office: [address] [facility] [address], [place] [address]  P: [phone]  F: [phone]    Patient: Andrew Toney   : 2000  Diagnosis/ICD-10 Code:  Closed displaced trimalleolar fracture of right ankle with routine healing, subsequent encounter [S82.851D]  Referring practitioner: ADONIS Coelho DPM  Today's Date: 10/20/2023  Patient seen for 9 sessions                                                                                                                                                                                                                                                                                                                               VISIT#:  sessions authorized per POC (Recert due 2023 )     Precautions/Restrictions: S/p R trimalleolar fx on 2023 with ORIF on 2023    Subjective  Andrew Toney reports that his ankle has been hurting more lately but today it's doing better. He was diagnosed with covid last week but is feeling fine now and hasn't had fever for a while.      Objective  Min swelling in foot and ankle     See Exercise, Manual, and Modality Logs for complete treatment.     Home Exercises:  [id number]     Assessment/Plan   Pt demonstrates improvement in ROM and less pain in the ankle after manual treatment. Pt able to do standing exercises well with less pain noted. Pt continues to progress well. Has follow-up with MD next week and will determine if going to continue PT after speaking with surgeon.     Progress per Plan of Care and Progress strengthening /stabilization /functional activity            Timed:         Manual Therapy:   8      mins  69153;     Therapeutic Exercise:    15     mins  75164;     Neuromuscular Monet:    15    mins  06849;    Therapeutic Activity:          mins  31018;     Gait Training:           mins  50588;       Un-Timed:  Electrical Stimulation:    15     mins  80397 ( );    Timed  Treatment:   38   mins   Total Treatment:     53   mins     Cammie Miner, PT, DPT, OCS     [place] License # [id number]

## 2023-10-24 ENCOUNTER — TELEPHONE (OUTPATIENT)
Dept: ORTHOPEDIC SURGERY | Facility: CLINIC | Age: 23
End: 2023-10-24

## 2023-10-24 NOTE — TELEPHONE ENCOUNTER
Caller: Andrew Toney    Relationship to patient: Self    Best call back number: [phone]    Chief complaint: RIGHT ANKLE     Type of visit: POST OP     Requested date: ASAP      If rescheduling, when is the original appointment: PATIENT CANNOT COME IN TODAY DUE TO STARTING NEW JOB. NEXT AVAILABLE IS NOT UNTIL NOVEMBER 30TH. NOT SURE IF HE SHOULD WAIT THAT LONG DUE TO BEING POST OP. STATES HE IS STILL HAVING SOME SORENESS. PLEASE CALL BACK. PLEASE CALL BETWEEN 12:00 AND 12:30 IF POSSIBLE. DID NOT RESCHEDULE AT THIS TIME. WILL WAIT FOR OFFICE TO CALL.     Additional notes:OKAY TO LEAVE A VOICEMAIL

## 2023-10-24 NOTE — TELEPHONE ENCOUNTER
Spoke to patient. He is checking with boss to see how to work schedule out and when best time of day and will call back to reschedule.

## 2023-11-06 ENCOUNTER — HOSPITAL ENCOUNTER (OUTPATIENT)
Facility: HOSPITAL | Age: 23
Discharge: HOME | End: 2023-11-06
Attending: EMERGENCY MEDICINE | Admitting: EMERGENCY MEDICINE
Payer: MEDICAID

## 2023-11-06 VITALS
TEMPERATURE: 99.6 F | HEIGHT: 75 IN | WEIGHT: 202 LBS | HEART RATE: 110 BPM | SYSTOLIC BLOOD PRESSURE: 124 MMHG | RESPIRATION RATE: 16 BRPM | DIASTOLIC BLOOD PRESSURE: 75 MMHG | OXYGEN SATURATION: 99 % | BODY MASS INDEX: 25.12 KG/M2

## 2023-11-06 DIAGNOSIS — J02.8 VIRAL SORE THROAT: Primary | ICD-10-CM

## 2023-11-06 DIAGNOSIS — B97.89 VIRAL SORE THROAT: Primary | ICD-10-CM

## 2023-11-06 LAB — STREP A PCR: NOT DETECTED

## 2023-11-06 PROCEDURE — G0463 HOSPITAL OUTPT CLINIC VISIT: HCPCS | Performed by: NURSE PRACTITIONER

## 2023-11-06 PROCEDURE — 87651 STREP A DNA AMP PROBE: CPT | Performed by: EMERGENCY MEDICINE

## 2023-11-06 NOTE — Clinical Note
[facility] [place] [address] [place] [address] [place] [address]  Phone: [phone]    Andrew Toney was seen and treated in our emergency department on 11/6/2023.  He may return to work on 11/07/2023.         Thank you for choosing [facility].    Edgardo Wallis MD

## 2023-11-06 NOTE — DISCHARGE INSTRUCTIONS
Follow-up with primary care for further evaluation and treatment as needed.  If you do not have a primary care physician you can call patient care connection listed on this paperwork and they will assist you in finding a physician    You can get throat lozenges, Chloraseptic max, Chloraseptic spray over-the-counter which will help with your sore throat.    Tylenol/Motrin as needed for pain/fevers    You can also get Allegra-D, Zyrtec-D or Claritin-D at the pharmacy you must ask the pharmacist for this medication you must show your ID to get.    Make sure you are drinking plenty of fluids.

## 2023-11-06 NOTE — FSED PROVIDER NOTE
Subjective   History of Present Illness  The patient is a 22-year-old who presents to the ER with a sore throat for the past week. Patient reports he took some mucinex last night. Patient has not taken any tylenol, motrin.     Patient has a bag of qdoba in the room with him, reports he can barely eat it.     History provided by:  Patient   used: No        Review of Systems   HENT:  Positive for postnasal drip and sore throat.        Past Medical History:   Diagnosis Date    Abnormal ECG     Murmur, heart     states he has had this since he was young       No Known Allergies    Past Surgical History:   Procedure Laterality Date    ANKLE OPEN REDUCTION INTERNAL FIXATION Right 07/21/2023    Procedure: ANKLE OPEN REDUCTION INTERNAL FIXATION;  Surgeon: ADONIS Coelho DPM;  Location: [facility];  Service: Podiatry;  Laterality: Right;    ANKLE OPEN REDUCTION INTERNAL FIXATION  7/21/2023    ELBOW PROCEDURE      TONSILLECTOMY         Family History   Problem Relation Age of Onset    Dislocations Mother     Cancer Father        Social History     Socioeconomic History    Marital status: Single   Tobacco Use    Smoking status: Some Days     Types: Cigars   Vaping Use    Vaping Use: Never used   Substance and Sexual Activity    Alcohol use: Not Currently     Comment: Drink on weekends only    Drug use: Never    Sexual activity: Not Currently     Partners: Male     Birth control/protection: Condom           Objective   Physical Exam  Vitals and nursing note reviewed.   Constitutional:       Appearance: Normal appearance. He is well-developed.   HENT:      Head: Normocephalic.      Right Ear: Tympanic membrane and ear canal normal.      Left Ear: Tympanic membrane and ear canal normal.      Nose: Nose normal.      Mouth/Throat:      Lips: Pink.      Mouth: Mucous membranes are moist.      Pharynx: Oropharynx is clear. Uvula midline.   Eyes:      Extraocular Movements: Extraocular movements intact.       Conjunctiva/sclera: Conjunctivae normal.      Pupils: Pupils are equal, round, and reactive to light.   Cardiovascular:      Rate and Rhythm: Normal rate and regular rhythm.      Heart sounds: Normal heart sounds.   Pulmonary:      Effort: Pulmonary effort is normal.      Breath sounds: Normal breath sounds.   Abdominal:      General: Bowel sounds are normal.      Palpations: Abdomen is soft.   Musculoskeletal:      Cervical back: Full passive range of motion without pain, normal range of motion and neck supple.   Skin:     General: Skin is warm and dry.   Neurological:      General: No focal deficit present.      Mental Status: He is alert and oriented to person, place, and time.   Psychiatric:         Mood and Affect: Mood normal.         Behavior: Behavior normal. Behavior is cooperative.         Thought Content: Thought content normal.         Procedures           ED Course  ED Course as of 11/06/23 1854 Mon Nov 06, 2023 1851 STREP A PCR: Not Detected [DS]      ED Course User Index  [DS] Nallely Escobar APRN                                           Medical Decision Making  No history of immunocompromise. Nontoxic appearance. Patient euvolemic with no trismus. No airway compromise. No change in voice, exudates, enlarged lymph nodes. Able to tolerate PO. Given History and Exam I have low suspicion for this presentation being caused by PTA, RPA, Ludwigs angina, Epiglottitis or Bacterial Tracheitis, EBV, acute HIV, or Strep throat.    Patient with lots of post nasal drainage noted, strept is negative at this time. Patient advised to get throat lozenges or spray, but he should get Claritin-D, Allegra-D or Zyrtec and take as per manufacturing directions this will help with his head congestion, nasal congestion, throat drainage/.     Patient reports he had COVID last month.       Problems Addressed:  Viral sore throat: acute illness or injury    Amount and/or Complexity of Data Reviewed  Labs:   Decision-making details documented in ED Course.    Risk  OTC drugs.        Final diagnoses:   Viral sore throat       ED Disposition  ED Disposition       ED Disposition   Discharge    Condition   Stable    Comment   --               PATIENT CONNECTION - [facility]  [place] [id number] [phone]  In 1 week  As needed, If symptoms worsen, if you call this number they will help you find a primary care physician if needed.         Medication List      No changes were made to your prescriptions during this visit.

## 2023-11-06 NOTE — Clinical Note
[place] [id number] [place] [address] [place] [address]  Phone: [phone]    Andrew Toney was seen and treated in our emergency department on 11/6/2023.  He may return to work on 11/08/2023.         Thank you for choosing [facility].    Nallely Escobar APRN

## 2024-03-06 ENCOUNTER — OFFICE VISIT (OUTPATIENT)
Dept: PODIATRY | Facility: CLINIC | Age: 24
End: 2024-03-06
Payer: MEDICAID

## 2024-03-06 VITALS — BODY MASS INDEX: 25.12 KG/M2 | WEIGHT: 202 LBS | RESPIRATION RATE: 20 BRPM | HEIGHT: 75 IN

## 2024-03-06 DIAGNOSIS — M79.671 RIGHT FOOT PAIN: Primary | ICD-10-CM

## 2024-03-06 DIAGNOSIS — M20.11 HALLUX VALGUS, RIGHT: ICD-10-CM

## 2024-03-06 DIAGNOSIS — S82.851S CLOSED DISPLACED TRIMALLEOLAR FRACTURE OF RIGHT ANKLE, SEQUELA: ICD-10-CM

## 2024-03-06 DIAGNOSIS — L60.0 ONYCHOCRYPTOSIS: ICD-10-CM

## 2024-03-06 NOTE — PROGRESS NOTES
"03/06/2024  Foot and Ankle Surgery - Established Patient/Follow-up  Provider: Dr. Eliezer Coelho DPM  Location: [facility]    Subjective:  Andrew Toney is a 23 y.o. male.     Chief Complaint   Patient presents with    Right Ankle - Follow-up, Fracture     Closed displaced trimalleolar fx    Right Foot - Toe Pain    Follow-up     NO PCP       HPI: The patient is a 23-year-old male who is seeing me for follow-up regarding his right ankle. He is accompanied by an adult female.    The patient reports he has been working and everything is back to normal. He reports he works as an  and is on his feet a lot. He states he  rolled his ankle previously and did not feel the pain.    He states he has swelling and blood had rushed to his toes and they sat there for a long time. He thinks he has an ingrown toenail.    No Known Allergies    No current outpatient medications on file prior to visit.     No current facility-administered medications on file prior to visit.       Objective   Resp 20   Ht 190.5 cm (75\")   Wt 91.6 kg (202 lb)   BMI 25.25 kg/m²     Foot/Ankle Exam  GENERAL  Orientation:  AAOx3  Affect:  appropriate    VASCULAR     Right Foot Vascularity   Normal vascular exam    Dorsalis pedis:  2+  Posterior tibial:  2+  Skin temperature:  warm  Edema grading:  None  CFT:  < 3 seconds  Pedal hair growth:  Present  Varicosities:  none     Left Foot Vascularity   Normal vascular exam    Dorsalis pedis:  2+  Posterior tibial:  2+  Skin temperature:  warm  Edema grading:  None  CFT:  < 3 seconds  Pedal hair growth:  Present  Varicosities:  none     NEUROLOGIC     Right Foot Neurologic   Light touch sensation: normal  Hot/Cold sensation: normal  Achilles reflex:  2+     Left Foot Neurologic   Light touch sensation: normal  Hot/Cold sensation:  normal  Achilles reflex:  2+    MUSCULOSKELETAL     Right Foot Musculoskeletal   Arch:  Normal     Left Foot Musculoskeletal   Arch:  Normal    MUSCLE " STRENGTH     Right Foot Muscle Strength   Normal strength    Foot dorsiflexion:  5  Foot plantar flexion:  5  Foot inversion:  5  Foot eversion:  5     Left Foot Muscle Strength   Normal strength    Foot dorsiflexion:  5  Foot plantar flexion:  5  Foot inversion:  5  Foot eversion:  5    DERMATOLOGIC      Right Foot Dermatologic   Skin  Right foot skin is intact.   Nails comment:  Nails 1-5     Left Foot Dermatologic   Skin  Left foot skin is intact.   Nails comment:  Nails 1-5    TESTS     Right Foot Tests   Anterior drawer: negative  Varus tilt: negative     Left Foot Tests   Anterior drawer: negative  Varus tilt: negative     Right foot additional comments: 07/13/2023: Significant swelling, ecchymosis, and pain involving the perimalleolar region. No proximal fibular pain. No resting deformity. Range of motion, muscle strength and instability testing deferred secondary to guarding. X-ray shows a displaced unstable ankle fracture.    08/01/2023:  Incision sites are dry and stable with intact suture, staples. No evidence of dehiscence or infection. Mild erythema involving the anterior aspect of the ankle consistent with dressing burn. No breakdown. Range of motion is limited as expected secondary to guarding.    08/15/2023: Improved range of motion, mild swelling noted to the perimalar region. No significant discomfort with palpation. Incision sites are well healed.    09/12/2023:Mild swelling. Continued decreased range of motion and discomfort with range of motion exercises. No progressive deformity or instability.    03/06/2024:Discomfort with palpation involving the right great toe. Abduction deformity at the interphalangeal joint region.  No open wounds or signs of infection. No abnormal findings involving the toenail.    Assessment & Plan   Diagnoses and all orders for this visit:    1. Right foot pain (Primary)    2. Hallux valgus, right    3. Onychocryptosis    4. Closed displaced trimalleolar fracture of  right ankle, sequela  -     XR Ankle 3+ View Right      The patient is a 23-year-old male who is seeing me for follow-up several months after ORIF of ankle fracture.  Imaging was reviewed showing stable internal fixation and well-healed fractures.  Patient denies any significant pain involving his ankle.  He does have stiffness and discomfort.  His primary visit today was regarding his right great toe.  He has noticed discomfort and odd sensations with increased activity.  Clinically, he does have significant hallux valgus that appears to be isolated to the interphalangeal joint region.  I discussed the findings with the patient in office.  We reviewed treatment options which would include over the counter inserts with a metatarsal pad for symptom management.  I have also asked that he perform stretching and manual therapy exercises.  We discussed proper shoe gear.  Patient states that he understands and agrees.  He has opted to see me on an as-needed basis at this time.  Greater than 20 minutes spent before, during, and after evaluation for patient care    Orders Placed This Encounter   Procedures    XR Ankle 3+ View Right     Order Specific Question:   Reason for Exam:     Answer:   Closed displaced trimalleolar fx  ROOM 10  WB     Order Specific Question:   Release to patient     Answer:   Routine Release [[id number]]          Note is dictated utilizing voice recognition software. Unfortunately this leads to occasional typographical errors. I apologize in advance if the situation occurs. If questions occur please do not hesitate to call our office.    Transcribed from ambient dictation for ADONIS Coelho DPM by Vivi Rogers.  03/06/24   11:03 EST    Patient or patient representative verbalized consent to the visit recording.  I have personally performed the services described in this document as transcribed by the above individual, and it is both accurate and complete.

## 2025-02-12 ENCOUNTER — DOCUMENTATION (OUTPATIENT)
Dept: PHYSICAL THERAPY | Facility: CLINIC | Age: 25
End: 2025-02-12
Payer: COMMERCIAL

## 2025-02-12 NOTE — PROGRESS NOTES
Discharge Summary  Discharge Summary from Physical Therapy Report      Date of Initial PT visit: 9/14/2023  Number of Visits Seen: 9     Discharge Status of Patient:   At last visit: Pt demonstrates improvement in ROM and less pain in the ankle after manual treatment. Pt able to do standing exercises well with less pain noted. Pt continues to progress well. Has follow-up with MD next week and will determine if going to continue PT after speaking with surgeon.     Goals: Partially Met    Discharge Plan: Continue with current home exercise program as instructed  Patient to return to referring/providing physician    Comments: Pt did not return after 9th visit and follow-up with surgeon. Pt will be discharged at this time.    Date of Discharge: 2/12/2025      Cammie Miner, PT, DPT, OCS     [place] License # [id number]     [place] License # [id number]

## (undated) DEVICE — DRILL BIT

## (undated) DEVICE — CUFF TOURNI 1BLADDER 1PRT 42IN STRL

## (undated) DEVICE — IMPLANTABLE DEVICE
Type: IMPLANTABLE DEVICE | Site: ANKLE | Status: NON-FUNCTIONAL
Brand: DARCO
Removed: 2023-07-21

## (undated) DEVICE — HEADED COUNTERSINK: Brand: DARCO

## (undated) DEVICE — DRSNG GZ CURAD XEROFORM PETROLTM OVERWRP 1X8IN STRL

## (undated) DEVICE — PK EXTREM 50

## (undated) DEVICE — SPNG GZ WOVN 4X4IN 12PLY 10/BX STRL

## (undated) DEVICE — DRAPE,U/ SHT,SPLIT,PLAS,STERIL: Brand: MEDLINE

## (undated) DEVICE — C-ARM: Brand: UNBRANDED

## (undated) DEVICE — SPLNT SCOTCHCAST QUICKSTEP DBL/SD/FELT FIBRGLS 4X30IN WHT

## (undated) DEVICE — K-WIRE W/ LONG SMOOTH TIP
Type: IMPLANTABLE DEVICE | Site: ANKLE | Status: NON-FUNCTIONAL
Removed: 2023-07-21

## (undated) DEVICE — CVR HNDL LT SURG ACCSSRY BLU STRL

## (undated) DEVICE — DRILL BIT: Brand: DART-FIRE

## (undated) DEVICE — GLV SURG BIOGEL M LTX PF 7 1/2

## (undated) DEVICE — PENCL HND ROCKRSWTCH HOLSTR EZ CLEAN TP CRD 10FT

## (undated) DEVICE — SOL IRRIG NACL 1000ML

## (undated) DEVICE — SUT ETHLN 3/0 FS1 30IN 669H

## (undated) DEVICE — UNDERGLV SURG BIOGEL INDICAT PI SZ8 BLU

## (undated) DEVICE — BNDG ESMARK 4IN 12FT LF STRL BLU

## (undated) DEVICE — KT SURG TURNOVER 050

## (undated) DEVICE — SLV SCD CALF HEMOFORCE DVT THERP REPROC MD

## (undated) DEVICE — INTENDED FOR TISSUE SEPARATION, AND OTHER PROCEDURES THAT REQUIRE A SHARP SURGICAL BLADE TO PUNCTURE OR CUT.: Brand: BARD-PARKER ® CARBON RIB-BACK BLADES

## (undated) DEVICE — ANTIBACTERIAL UNDYED BRAIDED (POLYGLACTIN 910), SYNTHETIC ABSORBABLE SUTURE: Brand: COATED VICRYL

## (undated) DEVICE — SOLUTION,WATER,IRRIGATION,1000ML,STERILE: Brand: MEDLINE

## (undated) DEVICE — APPL CHLORAPREP HI/LITE TINTED 10.5ML ORNG

## (undated) DEVICE — GOWN,REINFORCE,POLY,SIRUS,BREATH SLV,XLG: Brand: MEDLINE